# Patient Record
Sex: FEMALE | Race: WHITE | NOT HISPANIC OR LATINO | Employment: FULL TIME | URBAN - METROPOLITAN AREA
[De-identification: names, ages, dates, MRNs, and addresses within clinical notes are randomized per-mention and may not be internally consistent; named-entity substitution may affect disease eponyms.]

---

## 2017-04-17 ENCOUNTER — ALLSCRIPTS OFFICE VISIT (OUTPATIENT)
Dept: OTHER | Facility: OTHER | Age: 47
End: 2017-04-17

## 2017-04-17 ENCOUNTER — GENERIC CONVERSION - ENCOUNTER (OUTPATIENT)
Dept: OTHER | Facility: OTHER | Age: 47
End: 2017-04-17

## 2017-04-17 DIAGNOSIS — Z12.31 ENCOUNTER FOR SCREENING MAMMOGRAM FOR MALIGNANT NEOPLASM OF BREAST: ICD-10-CM

## 2017-04-17 DIAGNOSIS — R92.8 OTHER ABNORMAL AND INCONCLUSIVE FINDINGS ON DIAGNOSTIC IMAGING OF BREAST: ICD-10-CM

## 2017-04-20 ENCOUNTER — GENERIC CONVERSION - ENCOUNTER (OUTPATIENT)
Dept: OTHER | Facility: OTHER | Age: 47
End: 2017-04-20

## 2017-04-20 LAB
ADEQUACY: (HISTORICAL): NORMAL
CLINICIAN PROVIDIED ICD 9 OR 10 (HISTORICAL): NORMAL
COMMENT (HISTORICAL): NORMAL
DIAGNOSIS (HISTORICAL): NORMAL
NOTE: (HISTORICAL): NORMAL
PERFORMED BY (HISTORICAL): NORMAL
REFLEX (HISTORICAL): NORMAL

## 2017-05-04 ENCOUNTER — GENERIC CONVERSION - ENCOUNTER (OUTPATIENT)
Dept: OTHER | Facility: OTHER | Age: 47
End: 2017-05-04

## 2017-05-04 ENCOUNTER — APPOINTMENT (OUTPATIENT)
Dept: LAB | Facility: CLINIC | Age: 47
End: 2017-05-04
Payer: COMMERCIAL

## 2017-05-04 ENCOUNTER — TRANSCRIBE ORDERS (OUTPATIENT)
Dept: LAB | Facility: CLINIC | Age: 47
End: 2017-05-04

## 2017-05-04 DIAGNOSIS — Z13.21 SCREENING FOR MALNUTRITION: Primary | ICD-10-CM

## 2017-05-04 LAB — VENIPUNCTURE: NORMAL

## 2017-05-04 PROCEDURE — 36415 COLL VENOUS BLD VENIPUNCTURE: CPT | Performed by: NURSE PRACTITIONER

## 2017-05-05 ENCOUNTER — GENERIC CONVERSION - ENCOUNTER (OUTPATIENT)
Dept: OTHER | Facility: OTHER | Age: 47
End: 2017-05-05

## 2017-05-05 LAB
25(OH)D3 SERPL-MCNC: 11.4 NG/ML (ref 30–100)
A/G RATIO (HISTORICAL): 1.2 (ref 1.2–2.2)
ALBUMIN SERPL BCP-MCNC: 3.6 G/DL (ref 3.5–5.5)
ALP SERPL-CCNC: 58 IU/L (ref 39–117)
ALT SERPL W P-5'-P-CCNC: 12 IU/L (ref 0–32)
AST SERPL W P-5'-P-CCNC: 15 IU/L (ref 0–40)
BILIRUB SERPL-MCNC: 0.3 MG/DL (ref 0–1.2)
BUN SERPL-MCNC: 6 MG/DL (ref 6–24)
BUN/CREA RATIO (HISTORICAL): 8 (ref 9–23)
CALCIUM SERPL-MCNC: 9.1 MG/DL (ref 8.7–10.2)
CHLORIDE SERPL-SCNC: 102 MMOL/L (ref 96–106)
CHOLEST SERPL-MCNC: 197 MG/DL (ref 100–199)
CHOLEST/HDLC SERPL: 4 RATIO UNITS (ref 0–4.4)
CO2 SERPL-SCNC: 21 MMOL/L (ref 18–29)
CREAT SERPL-MCNC: 0.76 MG/DL (ref 0.57–1)
DEPRECATED RDW RBC AUTO: 14.3 % (ref 12.3–15.4)
EGFR AFRICAN AMERICAN (HISTORICAL): 107 ML/MIN/1.73
EGFR-AMERICAN CALC (HISTORICAL): 93 ML/MIN/1.73
GLUCOSE SERPL-MCNC: 92 MG/DL (ref 65–99)
HCT VFR BLD AUTO: 35.9 % (ref 34–46.6)
HDLC SERPL-MCNC: 49 MG/DL
HGB BLD-MCNC: 11.4 G/DL (ref 11.1–15.9)
INTERPRETATION (HISTORICAL): NORMAL
LDLC SERPL CALC-MCNC: 108 MG/DL (ref 0–99)
MCH RBC QN AUTO: 25.8 PG (ref 26.6–33)
MCHC RBC AUTO-ENTMCNC: 31.8 G/DL (ref 31.5–35.7)
MCV RBC AUTO: 81 FL (ref 79–97)
PLATELET # BLD AUTO: 317 X10E3/UL (ref 150–379)
POTASSIUM SERPL-SCNC: 4.3 MMOL/L (ref 3.5–5.2)
RBC (HISTORICAL): 4.42 X10E6/UL (ref 3.77–5.28)
SODIUM SERPL-SCNC: 140 MMOL/L (ref 134–144)
TOT. GLOBULIN, SERUM (HISTORICAL): 3 G/DL (ref 1.5–4.5)
TOTAL PROTEIN (HISTORICAL): 6.6 G/DL (ref 6–8.5)
TRIGL SERPL-MCNC: 199 MG/DL (ref 0–149)
TSH SERPL DL<=0.05 MIU/L-ACNC: 1.62 UIU/ML (ref 0.45–4.5)
VLDLC SERPL CALC-MCNC: 40 MG/DL (ref 5–40)
WBC # BLD AUTO: 6.7 X10E3/UL (ref 3.4–10.8)

## 2017-05-10 ENCOUNTER — HOSPITAL ENCOUNTER (OUTPATIENT)
Dept: RADIOLOGY | Facility: CLINIC | Age: 47
Discharge: HOME | End: 2017-05-10
Payer: COMMERCIAL

## 2017-05-10 DIAGNOSIS — Z12.31 ENCOUNTER FOR SCREENING MAMMOGRAM FOR MALIGNANT NEOPLASM OF BREAST: ICD-10-CM

## 2017-05-10 PROCEDURE — G0202 SCR MAMMO BI INCL CAD: HCPCS

## 2017-05-11 ENCOUNTER — GENERIC CONVERSION - ENCOUNTER (OUTPATIENT)
Dept: OTHER | Facility: OTHER | Age: 47
End: 2017-05-11

## 2017-05-22 ENCOUNTER — HOSPITAL ENCOUNTER (OUTPATIENT)
Dept: RADIOLOGY | Facility: HOSPITAL | Age: 47
Discharge: HOME | End: 2017-05-22
Payer: COMMERCIAL

## 2017-05-22 ENCOUNTER — GENERIC CONVERSION - ENCOUNTER (OUTPATIENT)
Dept: OTHER | Facility: OTHER | Age: 47
End: 2017-05-22

## 2017-05-22 DIAGNOSIS — R92.8 OTHER ABNORMAL AND INCONCLUSIVE FINDINGS ON DIAGNOSTIC IMAGING OF BREAST: ICD-10-CM

## 2017-05-22 PROCEDURE — G0206 DX MAMMO INCL CAD UNI: HCPCS

## 2017-05-22 PROCEDURE — 76642 ULTRASOUND BREAST LIMITED: CPT

## 2017-07-22 ENCOUNTER — HOSPITAL ENCOUNTER (EMERGENCY)
Facility: HOSPITAL | Age: 47
Discharge: HOME | End: 2017-07-22
Attending: EMERGENCY MEDICINE | Admitting: EMERGENCY MEDICINE
Payer: COMMERCIAL

## 2017-07-22 VITALS
HEIGHT: 65 IN | DIASTOLIC BLOOD PRESSURE: 104 MMHG | SYSTOLIC BLOOD PRESSURE: 214 MMHG | OXYGEN SATURATION: 98 % | RESPIRATION RATE: 20 BRPM | TEMPERATURE: 97.7 F | HEART RATE: 78 BPM | BODY MASS INDEX: 38.32 KG/M2 | WEIGHT: 230 LBS

## 2017-07-22 VITALS
HEART RATE: 86 BPM | RESPIRATION RATE: 20 BRPM | DIASTOLIC BLOOD PRESSURE: 105 MMHG | WEIGHT: 230 LBS | TEMPERATURE: 97 F | OXYGEN SATURATION: 98 % | SYSTOLIC BLOOD PRESSURE: 195 MMHG | BODY MASS INDEX: 38.32 KG/M2 | HEIGHT: 65 IN

## 2017-07-22 DIAGNOSIS — I10 HIGH BLOOD PRESSURE: Primary | ICD-10-CM

## 2017-07-22 DIAGNOSIS — K08.89 TOOTH ACHE: ICD-10-CM

## 2017-07-22 DIAGNOSIS — K08.89 PAIN, DENTAL: Primary | ICD-10-CM

## 2017-07-22 PROCEDURE — 99282 EMERGENCY DEPT VISIT SF MDM: CPT

## 2017-07-22 RX ORDER — IBUPROFEN 200 MG
600 TABLET ORAL ONCE
COMMUNITY
End: 2018-07-03

## 2017-07-22 RX ORDER — ACETAMINOPHEN 325 MG/1
650 TABLET ORAL EVERY 6 HOURS PRN
COMMUNITY
End: 2020-10-10

## 2017-07-22 RX ORDER — OXYCODONE HYDROCHLORIDE AND ACETAMINOPHEN 5; 325 MG/1; MG/1
1 TABLET ORAL ONCE
Status: COMPLETED | OUTPATIENT
Start: 2017-07-22 | End: 2017-07-22

## 2017-07-22 RX ORDER — BUPIVACAINE HYDROCHLORIDE 5 MG/ML
5 INJECTION, SOLUTION EPIDURAL; INTRACAUDAL ONCE
Status: DISCONTINUED | OUTPATIENT
Start: 2017-07-22 | End: 2017-07-22 | Stop reason: HOSPADM

## 2017-07-22 RX ORDER — ONDANSETRON 4 MG/1
4 TABLET, ORALLY DISINTEGRATING ORAL EVERY 8 HOURS PRN
Qty: 20 TABLET | Refills: 0 | Status: SHIPPED | OUTPATIENT
Start: 2017-07-22 | End: 2018-07-03

## 2017-07-22 RX ORDER — OXYCODONE HYDROCHLORIDE AND ACETAMINOPHEN 5; 325 MG/1; MG/1
1 TABLET ORAL EVERY 6 HOURS PRN
Qty: 20 TABLET | Refills: 0 | Status: SHIPPED | OUTPATIENT
Start: 2017-07-22 | End: 2017-08-01

## 2017-07-22 RX ORDER — ONDANSETRON 4 MG/1
4 TABLET, ORALLY DISINTEGRATING ORAL ONCE
Status: COMPLETED | OUTPATIENT
Start: 2017-07-22 | End: 2017-07-22

## 2017-07-22 RX ORDER — PENICILLIN V POTASSIUM 250 MG/1
250 TABLET ORAL 4 TIMES DAILY
Qty: 40 TABLET | Refills: 0 | Status: SHIPPED | OUTPATIENT
Start: 2017-07-22 | End: 2017-07-29

## 2017-07-22 RX ORDER — BUPIVACAINE HYDROCHLORIDE AND EPINEPHRINE 5; 5 MG/ML; UG/ML
1 INJECTION, SOLUTION EPIDURAL; INTRACAUDAL; PERINEURAL ONCE
Status: COMPLETED | OUTPATIENT
Start: 2017-07-22 | End: 2017-07-22

## 2017-07-22 RX ADMIN — OXYCODONE HYDROCHLORIDE AND ACETAMINOPHEN 1 TABLET: 5; 325 TABLET ORAL at 01:07

## 2017-07-22 RX ADMIN — ONDANSETRON 4 MG: 4 TABLET, ORALLY DISINTEGRATING ORAL at 21:34

## 2017-07-22 RX ADMIN — BUPIVACAINE HYDROCHLORIDE AND EPINEPHRINE BITARTRATE 1 ML: 5; .005 INJECTION, SOLUTION SUBCUTANEOUS at 21:36

## 2017-11-17 ENCOUNTER — TRANSCRIBE ORDERS (OUTPATIENT)
Dept: ADMINISTRATIVE | Facility: HOSPITAL | Age: 47
End: 2017-11-17

## 2017-11-17 DIAGNOSIS — R92.8 OTHER ABNORMAL AND INCONCLUSIVE FINDINGS ON DIAGNOSTIC IMAGING OF BREAST: ICD-10-CM

## 2017-11-17 DIAGNOSIS — Z09 FOLLOW-UP EXAM, 3-6 MONTHS SINCE PREVIOUS EXAM: Primary | ICD-10-CM

## 2017-11-27 ENCOUNTER — HOSPITAL ENCOUNTER (OUTPATIENT)
Dept: RADIOLOGY | Facility: HOSPITAL | Age: 47
Discharge: HOME | End: 2017-11-27
Payer: COMMERCIAL

## 2017-11-27 ENCOUNTER — GENERIC CONVERSION - ENCOUNTER (OUTPATIENT)
Dept: OTHER | Facility: OTHER | Age: 47
End: 2017-11-27

## 2017-11-27 DIAGNOSIS — Z09 FOLLOW-UP EXAM, 3-6 MONTHS SINCE PREVIOUS EXAM: ICD-10-CM

## 2017-11-27 PROCEDURE — G0206 DX MAMMO INCL CAD UNI: HCPCS

## 2018-01-10 NOTE — RESULT NOTES
Discussion/Summary   slight abnormality in outer left breast  diag mammo and u/s ordered  please schedule     Verified Results  * MAMMO SCREENING BILATERAL W CAD [id number] 08:00AM Alek Red Order Number: [id number]    - Patient Instructions: To schedule this appointment, please contact Central Scheduling at [phone]  Do not wear any perfume, powder, lotion or deodorant on breast or underarm area  Please bring your doctors order, referral (if needed) and insurance information with you on the day of the test  Failure to bring this information may result in this test being rescheduled  Arrive 15 minutes prior to your appointment time to register  On the day of your test, please bring any prior mammogram or breast studies with you that were not performed at a [facility]  Failure to bring prior exams may result in your test needing to be rescheduled  Test Name Result Flag Reference   MAMMO SCREENING BILATERAL W CAD (Report)     Patient History:   No known family history of cancer  No Hormone Replacement Therapy   Patient has never smoked  Patient's BMI is 38 3  Reason for exam: screening, asymptomatic  Screening     Mammo Screening Bilateral W CAD: May 10, 2017 - Check In #:    [de-identified]   Bilateral MLO, CC, and XCCL view(s) were taken  Technologist: HILARIO Bartlett   Prior study comparison: November 1, 2011, bilateral screening    mammogram, performed at [facility]  The breast tissue is heterogeneously dense, potentially limiting    the sensitivity of mammography  Patient risk, included in this    report, assists in determining the appropriate screening regimen    (such as 3-D mammography or the inclusion of automated breast    ultrasound or MRI)  3-D mammography may also remain indicated as    screening   There is a vague patch of asymmetric tissue with    questionable associated architectural distortion in the outer    left breast, identified on the craniocaudal and axillary rotated    craniocaudal views  This is located 10 cm deep to the nipple  This is most likely located in the upper half of the breast  The   patient should return for lateral and spot compression views,    possible rolled craniocaudal views, and possible ultrasound, to    ensure this is nothing more than summation artifact  The parenchymal pattern is otherwise stable  No dominant soft    tissue mass, architectural distortion or suspicious    calcifications are noted in either breast  The skin and nipple    contours are within normal limits  1  Vague asymmetry with questionable associated architectural    distortion outer left breast  Further evaluation recommended  2  Stable right mammogram      ACR BI-RADSï¾® Assessments: BiRad:0 - Incomplete: needs additional    imaging evaluation - Left     Recommendation:   Further imaging of the left breast    A breast health care nurse from our facility will be contacting    the patient regarding the need for additional imaging  Analyzed by CAD     8-10% of cancers will be missed on mammography  Management of a    palpable abnormality must be based on clinical grounds  Patients   will be notified of their results via letter from our facility  Accredited by [employer] of [employer] and FDA       Transcription Location: [facility] 98: [id number]     Risk Value(s):   Tyrer-Cuzick 10 Year: 2 900%, Tyrer-Cuzick Lifetime: 13 600%,    Myriad Table: 1 5%, JACINTO 5 Year: 0 9%, NCI Lifetime: 9 3%   Signed by:   Umer Amin MD   5/10/17       Signatures   Electronically signed by : BARBARA Pierson; May 11 2017  8:37AM EST                       (Author)

## 2018-01-11 NOTE — RESULT NOTES
Discussion/Summary   blood work is very good  vit D is very low  Vit D replacement called to pharmacy  Vit d 70463oftd once weekly for 12 weeks  Verified Results  (1) VITAMIN D 25-HYDROXY [id number] 12:00AM [facility]     Test Name Result Flag Reference   Vitamin D, 25-Hydroxy 11 4 ng/mL L 30 0-100 0   Vitamin D deficiency has been defined by the [address] practice guideline as a  level of serum 25-OH vitamin D less than 20 ng/mL (1,2)  The Endocrine Society went on to further define vitamin D  insufficiency as a level between 21 and 29 ng/mL (2)  1  IOM ([place] of [employer])  2010  Dietary reference     intakes for calcium and D  [address] [facility]: The     [employer]  2  [place] MF, Jim BUNDY, Melaine WEAVER, et al      Evaluation, treatment, and prevention of vitamin D     deficiency: an Endocrine Society clinical practice     guideline  JC  2011 Jul; 96(7):1911-30       (1) COMPREHENSIVE METABOLIC PANEL [id number] 18:80ZN IQMS     Test Name Result Flag Reference   Glucose, Serum 92 mg/dL  65-99   BUN 6 mg/dL  6-24   Creatinine, Serum 0 76 mg/dL  0 57-1 00   BUN/Creatinine Ratio 8 L 9-23   Sodium, Serum 140 mmol/L  134-144   Potassium, Serum 4 3 mmol/L  3 5-5 2   Chloride, Serum 102 mmol/L     Carbon Dioxide, Total 21 mmol/L  18-29   Calcium, Serum 9 1 mg/dL  8 7-10 2   Protein, Total, Serum 6 6 g/dL  6 0-8 5   Albumin, Serum 3 6 g/dL  3 5-5 5   Globulin, Total 3 0 g/dL  1 5-4 5   A/G Ratio 1 2  1 2-2 2   Bilirubin, Total 0 3 mg/dL  0 0-1 2   Alkaline Phosphatase, S 58 IU/L     AST (SGOT) 15 IU/L  0-40   ALT (SGPT) 12 IU/L  0-32   eGFR If NonAfricn Am 93 mL/min/1 73  >59   eGFR If Africn Am 107 mL/min/1 73  >59     (1) CBC/ PLT (NO DIFF) [id number] 12:00AM [facility]     Test Name Result Flag Reference   WBC 6 7 x10E3/uL  3 4-10 8   RBC 4 42 x10E6/uL  3 77-5 28   Hemoglobin 11 4 g/dL  11 1-15 9   Hematocrit 35 9 % 34 0-46  6   MCV 81 fL  79-97   MCH 25 8 pg L 26 6-33 0   MCHC 31 8 g/dL  31 5-35 7   RDW 14 3 %  12 3-15 4   Platelets 932 B56T0/HG  150-379     (1) TSH WITH FT4 REFLEX [id number] 12:00AM [facility]     Test Name Result Flag Reference   TSH 1 620 uIU/mL  0 450-4 500     (1) LIPID PANEL, FASTING [id number] 12:00AM [facility]     Test Name Result Flag Reference   Cholesterol, Total 197 mg/dL  100-199   Triglycerides 199 mg/dL H 0-149   HDL Cholesterol 49 mg/dL  >39   VLDL Cholesterol Julius 40 mg/dL  5-40   LDL Cholesterol Calc 108 mg/dL H 0-99   T  Chol/HDL Ratio 4 0 ratio units  0 0-4 4   T  Chol/HDL Ratio                                                             Men  Women                                               1/2 Avg  Risk  3 4    3 3                                                   Avg Risk  5 0    4 4                                                2X Avg  Risk  9 6    7 1                                                3X Avg  Risk 23 4   11 0     [facility]) Cardiovascular Risk Assessment [id number] 12:00AM [facility]     Test Name Result Flag Reference   Interpretation Note     -------------------------------  CARDIOVASCULAR REPORT:  -------------------------------  Current available clinical information suggests the  patient's risk is at least LOW  If the patient has two or  more major risk factors, the risk category is intermediate  If the patient has CHD or a CHD risk equivalent, the risk  category is high  If patient does not have CHD or a CHD risk  equivalent, consider use of the Pooled Cohort Equations to  estimate 10-year CVD risk, as individuals with greater than  7 5% risk may warrant more intensive therapy  The calculator  can be found at:  http://tools  cardiosource org/WIPIH-Pseb-Rnlbqdjia/  -  Insulin resistance, obesity, excessive alcohol use, smoking,  thyroid disease, nephrotic syndrome, liver disease, and  certain medications are all causes of secondary  dyslipidemia   Consider evaluation if clinically indicated  -  Fasting status is unknown; lipid assessment and treatment  suggestions assume patient was fasting  Therapeutic  lifestyle changes are always valuable to achieve optimal  blood lipid status (diet, exercise, weight management)  -------------------------------  LIPID MANAGEMENT  Select one patient risk category based upon medical history  and clinical judgment  Additional risk factors such as  personal or family history of premature CHD, smoking, and  hypertension modify a patient's goals of therapy  In CVD  prevention, the intensity of therapy should be adjusted to  the level of patient risk  MODERATE intensity statin therapy  generally results in an average LDL-C reduction of 30% to  less than 50% from the untreated baseline  Examples include  (daily doses): atorvastatin 10-20 mg, rosuvastatin 5-10 mg,  simvastatin 20-40 mg, pravastatin 40-80 mg, lovastatin 40  mg  HIGH intensity statin therapy generally results in an  average LDL-C reduction of 50% or more from the untreated  baseline  Examples include (daily doses): atorvastatin 40-80  mg and rosuvastatin 20 mg   -------------------------------  LOW RISK ASSESSMENT AND TREATMENT SUGGESTIONS  -------------------------------  LDL-C is acceptable, 108 mg/dL  Non-HDL Cholesterol is  acceptable, 148 mg/dL  -  Considerations for use of statin therapy include family  history of premature atherosclerotic disease, elevated  coronary artery calcium score, ankle-brachial index < 0 9,  elevated CRP, or elevated 10-year or lifetime CVD risk  Fasting status of blood is unknown; non-fasting may  contribute to hypertriglyceridemia  Triglycerides should  only be measured in a fasting state   -------------------------------  INTERMEDIATE RISK ASSESSMENT AND TREATMENT SUGGESTIONS  -------------------------------  LDL-C is acceptable, 108 mg/dL  Non-HDL Cholesterol is  acceptable, 148 mg/dL    -  Consider measurement of LDL particle number or Apo B to  adjudicate need for further LDL lowering therapy  Consider  beginning or increasing statin  Factors that may influence  statin use include family history of premature  atherosclerotic disease, elevated coronary artery calcium  score, ankle-brachial index < 0 9, elevated CRP, or elevated  10-year or lifetime CVD risk  If statin cannot be tolerated  or increased, alternatives include use of an intestinal  agent (ezetimibe or bile acid sequestrant), niacin, and/or  fish oil   -------------------------------  HIGH RISK ASSESSMENT AND TREATMENT SUGGESTIONS  -------------------------------  LDL-C is borderline high, 108 mg/dL  Non-HDL Cholesterol is  borderline high, 148 mg/dL  -  Begin statin  If statin already in use, consider increasing  dose to achieve at least a 50% LDL reduction from baseline  Moderate or high intensity statin is preferred  If statin  cannot be tolerated or increased, alternatives include use  of an intestinal agent (ezetimibe or bile acid sequestrant),  niacin, and/or fish oil   -------------------------------  DISCLAIMER  These assessments and treatment suggestions are provided as  a convenience in support of the physician-patient  relationship and are not intended to replace the physician's  clinical judgment  They are derived from the national  guidelines in addition to other evidence and expert opinion  The clinician should consider this information within the  context of clinical opinion and the individual patient  SEE GUIDANCE FOR CARDIOVASCULAR REPORT: National Heart,  Lung, and Blood Wheeler's Third Report of the NCEP Expert  Panel on Detection, Evaluation and Treatment of High Blood  Cholesterol in Adults (ATP III) (2002  NIH publication  );  Jung et al  Diabetes Care 2008; 31(4):811-82;  Alexa et al  Clin Chem 2009; 55(3):407-419; Brayden Moura et  al  2013 ACC/AHA guideline on the treatment of blood  cholesterol to reduce atherosclerotic cardiovascular risk in  adults: a report of the American College of  Cardiology/American Heart Association Task Force on Practice  Guidelines  Circulation 6521;863(NFVLH 2):S1? S45  PDF Image            Plan  Vitamin D deficiency    · Vitamin D (Ergocalciferol) 15353 UNIT Oral Capsule; TAKE 1 CAPSULE Weekly

## 2018-01-12 VITALS
DIASTOLIC BLOOD PRESSURE: 90 MMHG | BODY MASS INDEX: 38.93 KG/M2 | RESPIRATION RATE: 14 BRPM | HEIGHT: 64 IN | SYSTOLIC BLOOD PRESSURE: 130 MMHG | HEART RATE: 84 BPM | WEIGHT: 228 LBS | TEMPERATURE: 98.1 F

## 2018-01-12 NOTE — PROGRESS NOTES
Assessment    1  Never a smoker   2  Occasional alcohol use   3  Daily caffeinated coffee consumption   4  Family history of hypertension (V17 49) (Z82 49) : Father   5  Family history of diabetes mellitus (V18 0) (Z83 3) : Father   6  Family history of Healthy adult : Mother, Father   7  Encounter for preventive health examination (V70 0) (Z00 00)   8  Snoring (786 09) (R06 83)   9  Witnessed apneic spells (786 03) (R06 81)   10  Menstrual irregularity (626 4) (N92 6)   11  Screening for diabetes mellitus (V77 1) (Z13 1)   12  Encounter for vitamin deficiency screening (V77 99) (Z13 21)   13  Encounter for lipid screening for cardiovascular disease (V77 91,V81 2) (Z13 220,Z13 6)   14  Other fatigue (780 79) (R53 83)   15  Encounter for gynecological examination with Papanicolaou smear of cervix (V72 31)    (Z01 419)   16  History of  Section    Plan  Encounter for gynecological examination with Papanicolaou smear of cervix    · (1) THIN PREP PAP WITH IMAGING; Status:Active; Requested for:45Arm9280; Maturation index required? : No  HPV? : if ASCUS  Encounter for lipid screening for cardiovascular disease    · (1) LIPID PANEL, FASTING; Status:Active; Requested for:05Rfc9028; Health Maintenance    · Follow Up in 2 Years Evaluation and Treatment  Follow-up  Status: Hold For - Scheduling   Requested for: 87Jrj7810   · Begin a limited exercise program ; Status:Complete;   Done: 63RZM4687   · Eat a low fat and low cholesterol diet ; Status:Complete;   Done: 09GYF1963   · Some eating tips that can help you lose weight ; Status:Complete;   Done: 07SUG6792   · We recommend that you bring your body mass index down to 26 ; Status:Complete;    Done: 30ULH5210   · Call [phone] if: You find a new or different kind of lump in your breast ;  Status:Complete;   Done: 21MEI6306   · Call [phone] if:  You have any warning signs of skin cancer ; Status:Complete;    Done: 75QVG2367  Health Maintenance, Encounter for vitamin deficiency screening    · (1) VITAMIN D 25-HYDROXY; Status:Active; Requested for:30Vea1094; Health Maintenance, Screening for diabetes mellitus    · (1) CBC/ PLT (NO DIFF); Status:Active; Requested for:82Plm2688;    · (1) COMPREHENSIVE METABOLIC PANEL; Status:Active; Requested for:92Jhk2399;   Menstrual irregularity    · (1) TSH WITH FT4 REFLEX; Status:Active; Requested for:50Nvz5562; Other fatigue, Snoring, Witnessed apneic spells    · *Polysomnography, Sleep Study, Diagnostic; Status:Need Information - Financial  Authorization; Requested for:17Apr2017;   there any other medical conditions or medications that would explain these      symptoms? : No  is the sleep disturbance affecting the patient's ability to function? :    History/Symptoms: : snoring, daytime fatigue, witnessed apnea  a face-to-face visit, with sleep documentation, performed prior to the order for sleep      study? : Yes  Study Only or Consult : Sleep Study Only Follow up with Referring Physician    Discussion/Summary  health maintenance visit Currently, she eats an adequate diet and has an inadequate exercise regimen  the risks and benefits of cervical cancer screening were discussed Pap test with reflex HPV testing was done today cervical cancer screening is needed every three years Breast cancer screening: the risks and benefits of breast cancer screening were discussed, monthly self breast exam was advised and mammogram has been ordered  Colorectal cancer screening: colorectal cancer screening is not indicated  Osteoporosis screening: bone mineral density testing is not indicated  Screening lab work includes hemoglobin, glucose, lipid profile, thyroid function testing and 25-hydroxyvitamin D  Advice and education were given regarding nutrition, aerobic exercise, weight loss and cardiovascular risk reduction  Patient discussion: discussed with the patient     The patient was counseled regarding instructions for management, patient and family education, impressions, risks and benefits of treatment options, importance of compliance with treatment  The treatment plan was reviewed with the patient/guardian  The patient/guardian understands and agrees with the treatment plan      Chief Complaint  PE with pap LMP 3 weeks ago  ck/lpn      History of Present Illness  HM, Adult Female: The patient is being seen for a health maintenance and gynecology evaluation  The last health maintenance visit was 4 year(s) ago  General Health: The patient's health since the last visit is described as good  She has regular dental visits  She denies vision problems  She denies hearing loss  Immunizations status: up to date  Lifestyle:  She consumes a diverse and healthy diet  She does not have any weight concerns  She exercises regularly  She does not use tobacco  She denies alcohol use  She denies drug use  Reproductive health: the patient is perimenopausal   she reports abnormal menses  Menstrual history: The cycles are irregular  she uses contraception  For contraception, she has a partner with a vasectomy  she is sexually active  Screening: cancer screening reviewed and current  Cervical cancer screening includes a pap smear performed 4 years ago  Breast cancer screening includes a mammogram performed 4 years ago  She hasn't been previously screened for colorectal cancer  metabolic screening reviewed and current  Metabolic screening includes uncertain timing of her last lipid profile, uncertain timing of her last glucose screening, uncertain timing of her last thyroid function test and no previous DEXA  risk screening reviewed and current  Cardiovascular risk factors: obesity, sedentary lifestyle and family history of cardiovascular disease, but no hypertension  General health risks: abnormal cervical cytology  Safety elements used: seat belt and safe driving habits     Risk assessments performed include depression symptoms  Risk findings: no depression symptoms  HPI: wants to be checked for sleep apnea   reports snoring, witnessed apnea      Review of Systems    Constitutional: feeling tired, but no fever  Eyes: No complaints of eye pain, no red eyes, no eyesight problems, no discharge, no dry eyes, no itching of eyes  ENT: no complaints of earache, no loss of hearing, no nose bleeds, no nasal discharge, no sore throat, no hoarseness  Cardiovascular: No complaints of slow heart rate, no fast heart rate, no chest pain, no palpitations, no leg claudication, no lower extremity edema  Respiratory: No complaints of shortness of breath, no wheezing, no cough, no SOB on exertion, no orthopnea, no PND  Gastrointestinal: No complaints of abdominal pain, no constipation, no nausea or vomiting, no diarrhea, no bloody stools  Genitourinary: No complaints of dysuria, no incontinence, no pelvic pain, no dysmenorrhea, no vaginal discharge or bleeding  Musculoskeletal: No complaints of arthralgias, no myalgias, no joint swelling or stiffness, no limb pain or swelling  Integumentary: No complaints of skin rash or lesions, no itching, no skin wounds, no breast pain or lump  Neurological: No complaints of headache, no confusion, no convulsions, no numbness, no dizziness or fainting, no tingling, no limb weakness, no difficulty walking  Psychiatric: Not suicidal, no sleep disturbance, no anxiety or depression, no change in personality, no emotional problems  Endocrine: No complaints of proptosis, no hot flashes, no muscle weakness, no deepening of the voice, no feelings of weakness  Hematologic/Lymphatic: No complaints of swollen glands, no swollen glands in the neck, does not bleed easily, does not bruise easily  Active Problems    1  _ (V72 31)   2  Acute bronchitis (466 0) (J20 9)   3  Acute upper respiratory infection (465 9) (J06 9)   4   Encounter for screening mammogram for malignant neoplasm of breast (V76 12)   (Z12 31)   5  Lumbago (724 2) (M54 5)   6  Post-artificial Menopause State (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 4)   7  Screening for malignant neoplasm of cervix (V76 2) (Z12 4)   8  Streptococcal sore throat (034 0) (J02 0)   9  Well adult on routine health check (V70 0) (Z00 00)    Surgical History    · History of  Section    Allergies    1  No Known Drug Allergies    Vitals   Recorded: 35Uwe2992 01:05PM   Temperature 98 1 F, Tympanic   Heart Rate 84, R Radial   Pulse Quality Normal, R Radial   Respiration Quality Normal   Respiration 14   Systolic 860, RUE, Sitting   Diastolic 90, RUE, Sitting   Height 5 ft 4 in   Weight 228 lb    BMI Calculated 39 14   BSA Calculated 2 07     Physical Exam    Constitutional   General appearance: No acute distress, well appearing and well nourished  Head and Face   Head and face: Normal     Eyes   Conjunctiva and lids: No swelling, erythema or discharge  Pupils and irises: Equal, round, reactive to light  Ears, Nose, Mouth, and Throat   External inspection of ears and nose: Normal     Otoscopic examination: Tympanic membranes translucent with normal light reflex  Canals patent without erythema  Hearing: Normal     Nasal mucosa, septum, and turbinates: Normal without edema or erythema  Lips, teeth, and gums: Normal, good dentition  Oropharynx: Normal with no erythema, edema, exudate or lesions  Neck   Neck: Supple, symmetric, trachea midline, no masses  Thyroid: Normal, no thyromegaly  Pulmonary   Respiratory effort: No increased work of breathing or signs of respiratory distress  Auscultation of lungs: Clear to auscultation  Cardiovascular   Palpation of heart: Normal PMI, no thrills  Auscultation of heart: Normal rate and rhythm, normal S1 and S2, no murmurs  Carotid pulses: 2+ bilaterally  Abdominal aorta: Normal     Pedal pulses: 2+ bilaterally      Examination of extremities for edema and/or varicosities: Normal     Chest   Breasts: Normal, no dimpling or skin changes appreciated  Palpation of breasts and axillae: Normal, no masses palpated  Abdomen   Abdomen: Non-tender, no masses  Liver and spleen: No hepatomegaly or splenomegaly  Examination for hernias: No hernia appreciated  Genitourinary   External genitalia and vagina: Normal, no lesions appreciated  Urethra: Normal, no discharge  Bladder: Not distended, no tenderness  Cervix: Normal, no lesions, Pap obtained  Uterus: Normal size, no tenderness, no masses  Adnexa/Parametria: Normal, no masses or tenderness  Lymphatic   Palpation of lymph nodes in neck: No lymphadenopathy  Palpation of lymph nodes in axillae: No lymphadenopathy  Musculoskeletal   Gait and station: Normal     Digits and nails: Normal without clubbing or cyanosis  Joints, bones, and muscles: Normal     Range of motion: Normal     Stability: Normal     Muscle strength/tone: Normal     Skin   Skin and subcutaneous tissue: Normal without rashes or lesions  Neurologic   Cranial nerves: Cranial nerves II-XII intact  Reflexes: 2+ and symmetric  Sensation: No sensory loss  Psychiatric   Judgment and insight: Normal     Orientation to person, place, and time: Normal     Recent and remote memory: Intact  Mood and affect: Normal        Results/Data  PHQ-2 Adult Depression Screening 69Mll5254 01:10PM User, [facility]     Test Name Result Flag Reference   PHQ-2 Adult Depression Score 0     Over the last two weeks, how often have you been bothered by any of the following problems? Little interest or pleasure in doing things: Not at all - 0  Feeling down, depressed, or hopeless: Not at all - 0   PHQ-2 Adult Depression Screening Negative         Signatures   Electronically signed by : BARBARA Childress;  Apr 17 2017  1:53PM EST                       (Author)    Electronically signed by : SB Raman ; Apr 17 2017  2:33PM EST                       (Author)

## 2018-01-12 NOTE — RESULT NOTES
Discussion/Summary   no mass is seen  previous area of assymetry is less prominent  f/u mammo in 6 months     Verified Results  MAMMO DIAGNOSTIC LEFT W CAD [id number] 09:40AM Debora Osullivan     Test Name Result Flag Reference   MAMMO DIAGNOSTIC LEFT W CAD (Report)     Patient History:   No known family history of cancer  No Hormone Replacement Therapy   Patient has never smoked  Patient's BMI is 38 3  Reason for exam: addl evaluation requested from abnormal    screening  Previously seen area of asymmetry     Mammo Diagnostic Left W CAD: November 27, 2017 - Check In #:    [de-identified]   CC, ML, spot compression CC, and MLO view(s) were taken of the    left breast      Technologist: HILARIO Emery   Prior study comparison: May 22, 2017, mammo diagnostic left W CAD   performed at [place] [address]  May 22, 2017,   US breast left limited performed at [place] [address]  May 10, 2017, mammo screening bilateral W CAD,    performed at [address]  [facility]  November 1, 2011, bilateral screening mammogram performed at [place] [address]  The breast tissue is heterogeneously dense, potentially limiting    the sensitivity of mammography  Patient risk, included in this    report, assists in determining the appropriate screening regimen    (such as 3-D mammography or the inclusion of automated breast    ultrasound or MRI)  3-D mammography may also remain indicated as    screening  Previously described area of asymmetry in the    upper-outer breast is much less conspicuous  No dominant mass is   seen  There is no architectural distortion  Previously described area of asymmetry is much less    conspicuous  ACR BI-RADSï¾® Assessments: BiRad:3 - Probably Benign     Recommendation:   Follow-up diagnostic mammogram of the left breast in 6 months  Routine screening mammogram of the right breast in 6 months     Analyzed by CAD The patient is scheduled in a reminder system for screening    mammography  8-10% of cancers will be missed on mammography  Management of a    palpable abnormality must be based on clinical grounds  Patients   will be notified of their results via letter from our facility  Accredited by [employer] of [employer] and FDA       Transcription Location: [facility] 143: [id number]     Risk Value(s):   Tyrer-Cuzick 10 Year: 2 900%, Tyrer-Cuzick Lifetime: 13 600%,    Myriad Table: 1 5%, JACINTO 5 Year: 0 9%, NCI Lifetime: 9 3%   Signed by:   Delaney Pham MD   11/27/17       Signatures   Electronically signed by : BARBARA Abdul; Nov 27 2017 10:53AM EST                       (Author)

## 2018-01-14 NOTE — RESULT NOTES
Discussion/Summary   pap normal     Verified Results  (LC) Pap IG, rfx HPV ASCU [id number] 12:00AM Aldean Deal     Test Name Result Flag Reference   DIAGNOSIS: Comment     NEGATIVE FOR INTRAEPITHELIAL LESION AND MALIGNANCY  THE CYTOLOGY PROCESSING WAS PERFORMED AT THE [facility] LOCATED AT  [facility], [address] [id number]  Specimen adequacy: Comment     Satisfactory for evaluation  Endocervical and/or squamous metaplastic  cells (endocervical component) are present  Clinician provided ICD10: Comment     Z01 419   Performed by: Alisha Reed, Cytotechnologist (ASCP)     William Rios Note: Comment     The Pap smear is a screening test designed to aid in the detection of  premalignant and malignant conditions of the uterine cervix  It is not a  diagnostic procedure and should not be used as the sole means of detecting  cervical cancer  Both false-positive and false-negative reports do occur    Comment     The HPV DNA reflex criteria were not met with this specimen result  therefore, no HPV testing was performed  Signatures   Electronically signed by : BARBARA Munguia;  Apr 20 2017  8:09PM EST                       (Author)

## 2018-01-17 NOTE — RESULT NOTES
Discussion/Summary   imaging stable  Repeat mammo and u/s in 6 months  Monitor breasts for any changes  Call with concerns     Verified Results  *US BREAST LEFT LIMITED (DIAGNOSTIC) [id number] 08:03AM Valene Babinski Order Number: [id number]    - Patient Instructions: To schedule this appointment, please contact Central Scheduling at [phone]  Test Name Result Flag Reference   US BREAST LEFT LIMITED (Report)     Patient History:   No known family history of cancer  No Hormone Replacement Therapy   Patient has never smoked  Patient's BMI is 38 3  Reason for exam: addl evaluation requested from abnormal    screening  Callback from screening mammogram      Mammo Diagnostic Left W CAD: May 22, 2017 - Check In #: [de-identified]   CC, XCCL, and ML view(s) were taken of the left breast      Technologist: HILARIO William   Prior study comparison: May 10, 2017, mammo screening bilateral W   CAD, performed at [address] [place] [address]  November 1, 2011, bilateral screening mammogram performed at [address] [place] [address]  The breast tissue is heterogeneously dense, potentially limiting    the sensitivity of mammography  Patient risk, included in this    report, assists in determining the appropriate screening regimen    (such as 3-D mammography or the inclusion of automated breast    ultrasound or MRI)  3-D mammography may also remain indicated as    screening  The screening mammogram described a tissue asymmetry    in the outer left breast with questionable associated    architectural distortion  There was no correlate on the MLO    projection  This area is much less prominent on compression    views  A definite correlate again was not visualized on the    straight lateral view although the majority of the breast tissue    is in the upper breast  Targeted ultrasound of the upper outer    left breast is recommended       Targeted sonographic evaluation of the upper outer quadrant of    the left breast reveals an 8 x 4 x 8 mm complicated cyst at the    12 o'clock position 5 cm from the nipple  There is a 9 x 4 x 8    mm complicated cyst at the 3 o'clock position 7 cm from the    nipple  There is no solid mass or abnormal shadowing  US Breast Left Limited: May 22, 2017 - Check In #: [de-identified]   Standard views  Technologist: Marie Harrison RDMS   Heterogeneity can be either focal or diffuse  The breast    echotexture is characterized by multiple small areas of increased   and decreased echogenicity  Shadowing may occur at the    interfaces of the fat lobules and parenchyma  This pattern occurs   in younger breasts and those with heterogeneously dense    parenchyma depicted mammographically  Probable    complicated cysts left breast  The previously described    asymmetry with questionable architectural distortion in the outer   left breast is much less prominent on compression views  Recommend additional 6 month follow-up diagnostic mammogram to    confirm stability of these findings  ACR BI-RADSï¾® Assessments: BiRad:3 - Probably Benign (Overall)   Left breast Left Diag Mammo: BiRad:3 - probably benign finding in   the left breast    Left breast Left Brst US: BiRad:3 - probably benign finding in    the left breast      Recommendation:   Follow-up diagnostic mammogram of the left breast in 6 months  Analyzed by CAD     Transcription Location: [facility] 98: [id number]     Risk Value(s):   Tyrer-Cuzick 10 Year: 2 900%, Tyrer-Cuzick Lifetime: 13 600%,    Myriad Table: 1 5%, JACINTO 5 Year: 0 9%, NCI Lifetime: 9 3%   Signed by:   Quincy Fernando MD   5/22/17     MAMMO DIAGNOSTIC LEFT W CAD [id number] 08:02AM [employer] Order Number: [id number]    - Patient Instructions: To schedule this appointment, please contact Central Scheduling at [phone]  Do not wear any perfume, powder, lotion or deodorant on breast or underarm area     Please bring your doctors order, referral (if needed) and insurance information with you on the day of the test  Failure to bring this information may result in this test being rescheduled  Arrive 15 minutes prior to your appointment time to register  On the day of your test, please bring any prior mammogram or breast studies with you that were not performed at a [facility]  Failure to bring prior exams may result in your test needing to be rescheduled  Test Name Result Flag Reference   MAMMO DIAGNOSTIC LEFT W CAD (Report)     Patient History:   No known family history of cancer  No Hormone Replacement Therapy   Patient has never smoked  Patient's BMI is 38 3  Reason for exam: addl evaluation requested from abnormal    screening  Callback from screening mammogram      Mammo Diagnostic Left W CAD: May 22, 2017 - Check In #: [de-identified]   CC, XCCL, and ML view(s) were taken of the left breast      Technologist: HILARIO Mac   Prior study comparison: May 10, 2017, mammo screening bilateral W   CAD, performed at [address]  [facility]  November 1, 2011, bilateral screening mammogram performed at [place] [address]  The breast tissue is heterogeneously dense, potentially limiting    the sensitivity of mammography  Patient risk, included in this    report, assists in determining the appropriate screening regimen    (such as 3-D mammography or the inclusion of automated breast    ultrasound or MRI)  3-D mammography may also remain indicated as    screening  The screening mammogram described a tissue asymmetry    in the outer left breast with questionable associated    architectural distortion  There was no correlate on the MLO    projection  This area is much less prominent on compression    views   A definite correlate again was not visualized on the    straight lateral view although the majority of the breast tissue    is in the upper breast  Targeted ultrasound of the upper outer    left breast is recommended  Targeted sonographic evaluation of the upper outer quadrant of    the left breast reveals an 8 x 4 x 8 mm complicated cyst at the    12 o'clock position 5 cm from the nipple  There is a 9 x 4 x 8    mm complicated cyst at the 3 o'clock position 7 cm from the    nipple  There is no solid mass or abnormal shadowing  US Breast Left Limited: May 22, 2017 - Check In #: [de-identified]   Standard views  Technologist: Fausto Elizabeth RDMS   Heterogeneity can be either focal or diffuse  The breast    echotexture is characterized by multiple small areas of increased   and decreased echogenicity  Shadowing may occur at the    interfaces of the fat lobules and parenchyma  This pattern occurs   in younger breasts and those with heterogeneously dense    parenchyma depicted mammographically  Probable    complicated cysts left breast  The previously described    asymmetry with questionable architectural distortion in the outer   left breast is much less prominent on compression views  Recommend additional 6 month follow-up diagnostic mammogram to    confirm stability of these findings  ACR BI-RADSï¾® Assessments: BiRad:3 - Probably Benign (Overall)   Left breast Left Diag Mammo: BiRad:3 - probably benign finding in   the left breast    Left breast Left Brst US: BiRad:3 - probably benign finding in    the left breast      Recommendation:   Follow-up diagnostic mammogram of the left breast in 6 months     Analyzed by CAD     Transcription Location: [facility] 98: [id number]     Risk Value(s):   Tyrer-Cuzick 10 Year: 2 900%, Tyrer-Cuzick Lifetime: 13 600%,    Myriad Table: 1 5%, JACINTO 5 Year: 0 9%, NCI Lifetime: 9 3%   Signed by:   Jacqueline Farah MD   5/22/17       Signatures   Electronically signed by : BARBARA Mackey; May 22 2017  1:13PM EST                       (Author)

## 2018-05-04 ENCOUNTER — OFFICE VISIT (OUTPATIENT)
Dept: FAMILY MEDICINE CLINIC | Facility: CLINIC | Age: 48
End: 2018-05-04
Payer: COMMERCIAL

## 2018-05-04 VITALS
BODY MASS INDEX: 38.99 KG/M2 | TEMPERATURE: 98.2 F | RESPIRATION RATE: 16 BRPM | WEIGHT: 234 LBS | DIASTOLIC BLOOD PRESSURE: 80 MMHG | SYSTOLIC BLOOD PRESSURE: 130 MMHG | HEIGHT: 65 IN | HEART RATE: 72 BPM

## 2018-05-04 DIAGNOSIS — F41.9 ANXIETY: ICD-10-CM

## 2018-05-04 DIAGNOSIS — Z00.00 HEALTH CARE MAINTENANCE: Primary | ICD-10-CM

## 2018-05-04 DIAGNOSIS — E55.9 VITAMIN D DEFICIENCY: ICD-10-CM

## 2018-05-04 DIAGNOSIS — R92.2 DENSE BREASTS: ICD-10-CM

## 2018-05-04 DIAGNOSIS — Z13.0 SCREENING FOR DEFICIENCY ANEMIA: ICD-10-CM

## 2018-05-04 DIAGNOSIS — R23.2 HOT FLASHES: ICD-10-CM

## 2018-05-04 DIAGNOSIS — Z71.3 ENCOUNTER FOR WEIGHT LOSS COUNSELING: ICD-10-CM

## 2018-05-04 DIAGNOSIS — R06.81 WITNESSED EPISODE OF APNEA: ICD-10-CM

## 2018-05-04 DIAGNOSIS — R06.83 SNORING: ICD-10-CM

## 2018-05-04 DIAGNOSIS — Z13.1 DIABETES MELLITUS SCREENING: ICD-10-CM

## 2018-05-04 DIAGNOSIS — R92.8 ABNORMAL MAMMOGRAM OF LEFT BREAST: ICD-10-CM

## 2018-05-04 DIAGNOSIS — Z13.220 LIPID SCREENING: ICD-10-CM

## 2018-05-04 PROBLEM — N92.6 MENSTRUAL IRREGULARITY: Status: ACTIVE | Noted: 2017-04-17

## 2018-05-04 PROBLEM — R92.30 DENSE BREASTS: Status: ACTIVE | Noted: 2018-05-04

## 2018-05-04 PROCEDURE — 99396 PREV VISIT EST AGE 40-64: CPT | Performed by: NURSE PRACTITIONER

## 2018-05-04 RX ORDER — ERGOCALCIFEROL 1.25 MG/1
1 CAPSULE ORAL WEEKLY
COMMUNITY
Start: 2017-05-05 | End: 2018-07-03

## 2018-05-04 RX ORDER — BUPROPION HYDROCHLORIDE 150 MG/1
150 TABLET ORAL DAILY
Qty: 30 TABLET | Refills: 4 | Status: SHIPPED | OUTPATIENT
Start: 2018-05-04 | End: 2020-10-10

## 2018-05-04 NOTE — PROGRESS NOTES
FAMILY PRACTICE HEALTH MAINTENANCE OFFICE VISIT  [facility] - [facility]    NAME: Estefania Borrero  AGE: 51 y o  SEX: female  : 1970     DATE: 2018    Assessment and Plan     Problem List Items Addressed This Visit     Abnormal mammogram of left breast    Relevant Orders    Mammo diagnostic bilateral w 3d & cad    US breast left limited (diagnostic)    Vitamin D deficiency    Relevant Orders    Vitamin D 25 hydroxy    Anxiety    Relevant Medications    buPROPion (WELLBUTRIN XL) 150 mg 24 hr tablet    Other Relevant Orders    TSH, 3rd generation    Witnessed episode of apnea    Relevant Orders    Home Study    Snoring    Relevant Orders    Home Study    Dense breasts    Relevant Orders    Mammo diagnostic bilateral w 3d & cad    US breast left limited (diagnostic)      Other Visit Diagnoses     Health care maintenance    -  Primary    Relevant Orders    Basic metabolic panel    Encounter for weight loss counseling        Relevant Medications    buPROPion (WELLBUTRIN XL) 150 mg 24 hr tablet    Hot flashes        Relevant Medications    buPROPion (WELLBUTRIN XL) 150 mg 24 hr tablet    Other Relevant Orders    TSH, 3rd generation    CBC and Platelet    Lipid screening        Relevant Orders    Lipid panel    Diabetes mellitus screening        Relevant Orders    Basic metabolic panel    Screening for deficiency anemia        Relevant Orders    CBC and Platelet    BMI 11 4-59 1,KYRCT                · Patient Counseling:   · Nutrition: Stressed importance of a well balanced diet, moderation of sodium/saturated fat, caloric balance and sufficient intake of fiber  · Exercise: Stressed the importance of regular exercise with a goal of 150 minutes per week  · Dental Health: Discussed daily flossing and brushing and regular dental visits   · Injury Prevention: Discussed Safety Belts, Safety Helmets, and Smoke Detectors    · Immunizations reviewed   utd  · Discussed benefits of screening mammo  Pap and colonoscopy due next year  · Discussed the patient's BMI with her  The BMI is above average; BMI management plan is completed     Return in about 6 weeks (around 6/15/2018) for Recheck  Chief Complaint     Chief Complaint   Patient presents with    Annual Exam     pap last year negative    regular eye exams at [address] [place] [address] in [facility] Menopause       History of Present Illness     Here for annual PE    In usual state of health    Started exercise program in Jan 2018  Unable to lose weight  No significant diet changes    Still snoring,  reports witnessed apnea  Has not had sleep study yet    STOP BANG score 5    Showing signs of perimenopause=more menstrual irregularity, moodiness, anxiety, hot flashes        Well Adult Physical   Patient here for a comprehensive physical exam       Diet and Physical Activity  Diet: low fat diet  Weight concerns: Patient has class 1 obesity (BMI 30-34  9)  Exercise: several times per week      Depression Screen  PHQ-9 Depression Screening    PHQ-9:    Frequency of the following problems over the past two weeks:       Little interest or pleasure in doing things:  0 - not at all  Feeling down, depressed, or hopeless:  0 - not at all  PHQ-2 Score:  0          General Health  Hearing: Normal:  bilateral  Vision: no vision problems  Dental: regular dental visits    Reproductive Health  Perimenopause  Menses becoming more irregular  Starting to have hot flashes      The following portions of the patient's history were reviewed and updated as appropriate: allergies, current medications, past family history, past medical history, past social history, past surgical history and problem list     Review of Systems     Review of Systems   Constitutional: Positive for fatigue  Psychiatric/Behavioral: Positive for sleep disturbance  Negative for dysphoric mood and suicidal ideas  The patient is nervous/anxious      All other systems reviewed and are negative  Past Medical History     History reviewed  No pertinent past medical history  Past Surgical History     Past Surgical History:   Procedure Laterality Date     SECTION         Social History     Social History     Social History    Marital status: /Civil Union     Spouse name: N/A    Number of children: N/A    Years of education: N/A     Social History Main Topics    Smoking status: Never Smoker    Smokeless tobacco: Never Used    Alcohol use Yes      Comment: socially    Drug use: No    Sexual activity: Not Asked     Other Topics Concern    None     Social History Narrative    Daily caffeinated coffee consumption           Family History     Family History   Problem Relation Age of Onset    Diabetes Father     Hypertension Father        Current Medications       Current Outpatient Prescriptions:     acetaminophen (TYLENOL) 325 mg tablet, Take 650 mg by mouth every 6 (six) hours as needed for mild pain, Disp: , Rfl:     buPROPion (WELLBUTRIN XL) 150 mg 24 hr tablet, Take 1 tablet (150 mg total) by mouth daily, Disp: 30 tablet, Rfl: 4    ergocalciferol (VITAMIN D2) 50,000 units, Take 1 capsule by mouth once a week, Disp: , Rfl:     ibuprofen (MOTRIN) 200 mg tablet, Take 600 mg by mouth once, Disp: , Rfl:     ondansetron (ZOFRAN-ODT) 4 mg disintegrating tablet, Take 1 tablet by mouth every 8 (eight) hours as needed for nausea or vomiting, Disp: 20 tablet, Rfl: 0     Allergies     No Known Allergies    Objective     /80 (BP Location: Left arm, Patient Position: Sitting, Cuff Size: Adult)   Pulse 72   Temp 98 2 °F (36 8 °C) (Temporal)   Resp 16   Ht 5' 5" (1 651 m)   Wt 106 kg (234 lb)   BMI 38 94 kg/m²      Physical Exam   Constitutional: She is oriented to person, place, and time  Vital signs are normal  She appears well-developed and well-nourished  No distress  HENT:   Head: Normocephalic and atraumatic     Mouth/Throat: Oropharynx is clear and moist  Eyes: Conjunctivae, EOM and lids are normal  Pupils are equal, round, and reactive to light  Lids are everted and swept, no foreign bodies found  No scleral icterus  Neck: Normal range of motion  Neck supple  No JVD present  Carotid bruit is not present  No thyroid mass and no thyromegaly present  Cardiovascular: Normal rate, regular rhythm, normal heart sounds and normal pulses  No murmur heard  Pulmonary/Chest: Effort normal and breath sounds normal  No respiratory distress  Abdominal: Soft  Normal appearance, normal aorta and bowel sounds are normal  There is no splenomegaly or hepatomegaly  There is no tenderness  No hernia  Musculoskeletal: Normal range of motion  She exhibits no edema  Lymphadenopathy:     She has no cervical adenopathy  She has no axillary adenopathy  Neurological: She is alert and oriented to person, place, and time  She has normal strength  No cranial nerve deficit or sensory deficit  Skin: Skin is warm, dry and intact  No pallor  Psychiatric: She has a normal mood and affect  Her behavior is normal    Nursing note and vitals reviewed          No exam data present    Health Maintenance     Health Maintenance   Topic Date Due    HIV SCREENING  1970    Depression Screening PHQ-9  04/15/1981    PAP SMEAR  04/17/2018    INFLUENZA VACCINE  09/01/2018    DTaP,Tdap,and Td Vaccines (2 - Td) 08/11/2021     Immunization History   Administered Date(s) Administered    Tdap 08/11/2011       Constance Morton, [address]

## 2018-05-10 ENCOUNTER — TELEPHONE (OUTPATIENT)
Dept: FAMILY MEDICINE CLINIC | Facility: CLINIC | Age: 48
End: 2018-05-10

## 2018-05-10 DIAGNOSIS — R06.83 SNORING: Primary | ICD-10-CM

## 2018-05-10 DIAGNOSIS — R06.81 WITNESSED EPISODE OF APNEA: ICD-10-CM

## 2018-05-10 DIAGNOSIS — R29.818 SUSPECTED SLEEP APNEA: ICD-10-CM

## 2018-05-10 NOTE — TELEPHONE ENCOUNTER
Moriah Junior called from [facility] [phone] home sleep test not approved states pt  Needs to have done at sleep center we can call above number and speak with clinician

## 2018-05-22 ENCOUNTER — TRANSCRIBE ORDERS (OUTPATIENT)
Dept: SLEEP CENTER | Facility: CLINIC | Age: 48
End: 2018-05-22

## 2018-06-08 ENCOUNTER — HOSPITAL ENCOUNTER (OUTPATIENT)
Dept: RADIOLOGY | Facility: HOSPITAL | Age: 48
Discharge: HOME | End: 2018-06-08
Payer: COMMERCIAL

## 2018-06-08 ENCOUNTER — TELEPHONE (OUTPATIENT)
Dept: FAMILY MEDICINE CLINIC | Facility: CLINIC | Age: 48
End: 2018-06-08

## 2018-06-08 DIAGNOSIS — R92.2 DENSE BREASTS: ICD-10-CM

## 2018-06-08 DIAGNOSIS — R92.8 ABNORMAL MAMMOGRAM OF LEFT BREAST: ICD-10-CM

## 2018-06-08 PROCEDURE — 77066 DX MAMMO INCL CAD BI: CPT

## 2018-06-08 PROCEDURE — G0279 TOMOSYNTHESIS, MAMMO: HCPCS

## 2018-06-08 NOTE — TELEPHONE ENCOUNTER
----- [facility] sent at 6/8/2018 10:32 AM EDT -----  mammo is unchanged  No suspicious findings   Repeat in 1 year

## 2018-06-08 NOTE — TELEPHONE ENCOUNTER
----- [facility] sent at 6/8/2018 10:32 AM EDT -----  mammo is unchanged  No suspicious findings   Repeat in 1 year

## 2018-06-21 ENCOUNTER — HOSPITAL ENCOUNTER (OUTPATIENT)
Dept: SLEEP CENTER | Facility: CLINIC | Age: 48
Discharge: HOME | End: 2018-06-21
Payer: COMMERCIAL

## 2018-06-21 DIAGNOSIS — R06.81 WITNESSED EPISODE OF APNEA: ICD-10-CM

## 2018-06-21 DIAGNOSIS — R06.83 SNORING: ICD-10-CM

## 2018-06-21 DIAGNOSIS — R29.818 SUSPECTED SLEEP APNEA: ICD-10-CM

## 2018-06-21 PROCEDURE — 95810 POLYSOM 6/> YRS 4/> PARAM: CPT | Performed by: INTERNAL MEDICINE

## 2018-06-21 PROCEDURE — 95810 POLYSOM 6/> YRS 4/> PARAM: CPT

## 2018-06-22 ENCOUNTER — TRANSCRIBE ORDERS (OUTPATIENT)
Dept: SLEEP CENTER | Facility: CLINIC | Age: 48
End: 2018-06-22

## 2018-06-22 NOTE — PROGRESS NOTES
Sleep Study Documentation    Pre-Sleep Study       Sleep testing procedure explained to patient:YES    Patient napped prior to study:NO    Caffeine:Dayshift worker after 12PM   Caffeine use:NO    Alcohol:Dayshift workers after 5PM: Alcohol use:NO    Typical day for patient:YES       Study Documentation  Diagnostic   Snore: Moderate  Supplemental O2: no    O2 flow rate (L/min) range   O2 flow rate (L/min) final   Minimum SaO2 96 7  Baseline SaO2 76 0        Mode of Therapy:    EKG abnormalities: no     EEG abnormalities: no    Study Terminated:no    Patient classification: employed       Post-Sleep Study    Medication used at bedtime or during sleep study:NO    Patient reports time it took to fall asleep:20 to 30 minutes    Patient reports waking up during study:3 or more times  Patient reports returning to sleep in 10 to 30 minutes  Patient reports sleeping 2 to 4 hours with dreaming      Patient reports sleep during study:worse than usual    Patient rated sleepiness: Somewhat sleepy or tired    PAP treatment:no

## 2018-06-26 ENCOUNTER — TELEPHONE (OUTPATIENT)
Dept: FAMILY MEDICINE CLINIC | Facility: CLINIC | Age: 48
End: 2018-06-26

## 2018-06-26 NOTE — TELEPHONE ENCOUNTER
----- Message from Barak Boss, [address] sent at 6/26/2018  2:06 PM EDT -----  Please advise Royce Cervantes that sleep study was positive for sleep apnea  Please give her Dr Eliza Guaman (pulm/sleep medicine) number to schedule consult   TY

## 2018-06-27 ENCOUNTER — TELEPHONE (OUTPATIENT)
Dept: FAMILY MEDICINE CLINIC | Facility: CLINIC | Age: 48
End: 2018-06-27

## 2018-06-27 NOTE — TELEPHONE ENCOUNTER
Pt called concerned because she received a bill for $521 for her recent yearly mammo  She called her insurance company and they stated it was coded as diagnostic instead of routine  Please review chart to see if the coding can be changed to routine

## 2018-06-27 NOTE — TELEPHONE ENCOUNTER
She past mammos were abnormal  I was told by radiology in past that it should be a diagnostic mammo which is why order  Can you talk to radiology or billing on how we would change it to screening   Ty

## 2018-07-03 ENCOUNTER — OFFICE VISIT (OUTPATIENT)
Dept: PULMONOLOGY | Facility: MEDICAL CENTER | Age: 48
End: 2018-07-03
Payer: COMMERCIAL

## 2018-07-03 VITALS
TEMPERATURE: 97.8 F | SYSTOLIC BLOOD PRESSURE: 142 MMHG | OXYGEN SATURATION: 97 % | WEIGHT: 230 LBS | HEART RATE: 70 BPM | RESPIRATION RATE: 16 BRPM | HEIGHT: 65 IN | BODY MASS INDEX: 38.32 KG/M2 | DIASTOLIC BLOOD PRESSURE: 94 MMHG

## 2018-07-03 DIAGNOSIS — G47.33 OSA (OBSTRUCTIVE SLEEP APNEA): Primary | ICD-10-CM

## 2018-07-03 PROCEDURE — 99203 OFFICE O/P NEW LOW 30 MIN: CPT | Performed by: INTERNAL MEDICINE

## 2018-07-03 RX ORDER — CALCIUM CARBONATE/VITAMIN D3 600 MG-10
TABLET ORAL
COMMUNITY

## 2018-07-03 RX ORDER — DIPHENOXYLATE HYDROCHLORIDE AND ATROPINE SULFATE 2.5; .025 MG/1; MG/1
1 TABLET ORAL DAILY
COMMUNITY

## 2018-07-03 NOTE — PROGRESS NOTES
Assessment/Plan:       Problem List Items Addressed This Visit        Respiratory    LIBORIO (obstructive sleep apnea) - Primary     Patient's underlying risk factors are reviewed  Underlying pathophysiology and risks of untreated sleep apnea were discussed in detail  Treatment options including CPAP, surgical options, dental device or discussed in detail  Patient is agreeable to undergoing CPAP therapy  Auto CPAP is ordered for her today  Periodic mask, tubing, filter replacement every 1-3 months is advised and periodic  approximately every year is advised  Uses CPAP during all sleep periods is discussed  Avoidance of sedatives, supine sleep, alcohol, narcotics in the setting of untreated sleep apnea is discussed  Absolute avoidance of driving while drowsy is also discussed  Avoid weight gain/encourage weight loss  Relevant Orders    CPAP Auto New DME             Follow-up in 6-8 weeks   All questions are answered to the patient's satisfaction and understanding  She verbalizes understanding  She is encouraged to call with any further questions or concerns  Portions of the record may have been created with voice recognition software  Occasional wrong word or "sound a like" substitutions may have occurred due to the inherent limitations of voice recognition software  Read the chart carefully and recognize, using context, where substitutions have occurred  Electronically Signed by Electa Seip, MD    ______________________________________________________________________    Chief Complaint:   Chief Complaint   Patient presents with    Results     Sleep study        Patient ID: Nolvia Goldberg is a 51 y o  y o  female has no past medical history on file  7/3/2018  Patient presents for initial evaluation for sleep apnea  She did undergo diagnostic sleep testing  Snoring for awhile  More recently found to be gasping for air  Not getting quite as much sleep as she used to get  Time to bed is around 10-11 pm  It takes her about 10 min to fall asleep  Once asleep she wakes up about 1-4 times per night  She wakes up choking and gasping  It takes her about 5 minutes to fall back asleep  Time out of bed is around 6:30- 7 am  On weekends until 8 am    Occasionally feels refreshed  No daytime sleepiness  No daytime fatigue  She does drink caffeine about 1-2 cups of coffee and possibly one cup in the afternoon  She has gained about 10 pounds in the last year  She does exercise about 3 times a week  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   Travel history: no recent travel  Review of Systems   Constitutional: Negative for activity change, appetite change, chills, fever and unexpected weight change  HENT: Negative for congestion, dental problem, postnasal drip, sinus pain, sinus pressure, sore throat and voice change  Eyes: Negative for visual disturbance  Cardiovascular: Negative for chest pain, palpitations and leg swelling  Gastrointestinal: Negative for abdominal pain, diarrhea, nausea and vomiting  Genitourinary: Negative for difficulty urinating  Musculoskeletal: Negative for arthralgias  Skin: Negative for rash and wound  Allergic/Immunologic: Negative for environmental allergies and food allergies  Neurological: Negative for dizziness, light-headedness and headaches  Hematological: Negative for adenopathy  Psychiatric/Behavioral: Negative for agitation, behavioral problems and confusion  Social history: She reports that she has never smoked  She has never used smokeless tobacco  She reports that she drinks alcohol  She reports that she does not use drugs      Past surgical history:   Past Surgical History:   Procedure Laterality Date     SECTION       Family history:   Family History   Problem Relation Age of Onset    Diabetes Father     Hypertension Father        Immunization History   Administered Date(s) Administered    Tdap 08/11/2011     Current Outpatient Prescriptions   Medication Sig Dispense Refill    acetaminophen (TYLENOL) 325 mg tablet Take 650 mg by mouth every 6 (six) hours as needed for mild pain      buPROPion (WELLBUTRIN XL) 150 mg 24 hr tablet Take 1 tablet (150 mg total) by mouth daily 30 tablet 4    multivitamin (THERAGRAN) TABS Take 1 tablet by mouth daily      Omega 3 1200 MG CAPS Take by mouth       No current facility-administered medications for this visit  Allergies: Patient has no known allergies  Objective:  Vitals:    07/03/18 0912   BP: 142/94   BP Location: Left arm   Patient Position: Sitting   Cuff Size: Standard   Pulse: 70   Resp: 16   Temp: 97 8 °F (36 6 °C)   TempSrc: Tympanic   SpO2: 97%   Weight: 104 kg (230 lb)   Height: 5' 5" (1 651 m)   Oxygen Therapy  SpO2: 97 %    Wt Readings from Last 3 Encounters:   07/03/18 104 kg (230 lb)   05/04/18 106 kg (234 lb)   07/22/17 104 kg (230 lb)     Body mass index is 38 27 kg/m²  Physical Exam   Constitutional: She is oriented to person, place, and time  She appears well-developed and well-nourished  No distress  HENT:   Head: Normocephalic and atraumatic  Mouth/Throat: Oropharynx is clear and moist  No oropharyngeal exudate  Mallampati-4   Eyes: EOM are normal  Pupils are equal, round, and reactive to light  Neck: Normal range of motion  Neck supple  No tracheal deviation present  No thyromegaly present  Neck circumference 16   Cardiovascular: Normal rate and regular rhythm  No murmur heard  Pulmonary/Chest: Effort normal and breath sounds normal  No respiratory distress  She has no wheezes  She has no rales  She exhibits no tenderness  Abdominal: Soft  Bowel sounds are normal  She exhibits no distension  There is no tenderness  Musculoskeletal: Normal range of motion  She exhibits no edema  Lymphadenopathy:     She has no cervical adenopathy     Neurological: She is alert and oriented to person, place, and time  No cranial nerve deficit  Skin: Skin is warm and dry  She is not diaphoretic  Psychiatric: She has a normal mood and affect  Her behavior is normal    Vitals reviewed  Lab Review:   No visits with results within 2 Month(s) from this visit  Latest known visit with results is:   Generic Conversion - Encounter on 05/04/2017   Component Date Value    INTERPRETATION 05/04/2017 Note        Diagnostics:  I have personally reviewed pertinent reports

## 2018-07-03 NOTE — LETTER
July 5, 2018     Fausto Young, [address] [facility]    Patient: Ronald Carter   YOB: 1970   Date of Visit: 7/3/2018       Dear Dr Renata Lim: Thank you for referring Ronald Carter to me for evaluation  Below are my notes for this consultation  If you have questions, please do not hesitate to call me  I look forward to following your patient along with you  Sincerely,        Naun Richardson MD        CC: No Recipients  Naun Richardson MD  7/5/2018 10:33 AM  Sign at close encounter  Assessment/Plan:       Problem List Items Addressed This Visit        Respiratory    LIBORIO (obstructive sleep apnea) - Primary     Patient's underlying risk factors are reviewed  Underlying pathophysiology and risks of untreated sleep apnea were discussed in detail  Treatment options including CPAP, surgical options, dental device or discussed in detail  Patient is agreeable to undergoing CPAP therapy  Auto CPAP is ordered for her today  Periodic mask, tubing, filter replacement every 1-3 months is advised and periodic  approximately every year is advised  Uses CPAP during all sleep periods is discussed  Avoidance of sedatives, supine sleep, alcohol, narcotics in the setting of untreated sleep apnea is discussed  Absolute avoidance of driving while drowsy is also discussed  Avoid weight gain/encourage weight loss  Relevant Orders    CPAP Auto New DME             Follow-up in 6-8 weeks   All questions are answered to the patient's satisfaction and understanding  She verbalizes understanding  She is encouraged to call with any further questions or concerns  Portions of the record may have been created with voice recognition software  Occasional wrong word or "sound a like" substitutions may have occurred due to the inherent limitations of voice recognition software    Read the chart carefully and recognize, using context, where substitutions have occurred  Electronically Signed by Canelo Richey MD    ______________________________________________________________________    Chief Complaint:   Chief Complaint   Patient presents with    Results     Sleep study        Patient ID: Zackery Angeles is a 51 y o  y o  female has no past medical history on file  7/3/2018  Patient presents for initial evaluation for sleep apnea  She did undergo diagnostic sleep testing  Snoring for awhile  More recently found to be gasping for air  Not getting quite as much sleep as she used to get  Time to bed is around 10-11 pm  It takes her about 10 min to fall asleep  Once asleep she wakes up about 1-4 times per night  She wakes up choking and gasping  It takes her about 5 minutes to fall back asleep  Time out of bed is around 6:30- 7 am  On weekends until 8 am    Occasionally feels refreshed  No daytime sleepiness  No daytime fatigue  She does drink caffeine about 1-2 cups of coffee and possibly one cup in the afternoon  She has gained about 10 pounds in the last year  She does exercise about 3 times a week  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   Travel history: no recent travel  Review of Systems   Constitutional: Negative for activity change, appetite change, chills, fever and unexpected weight change  HENT: Negative for congestion, dental problem, postnasal drip, sinus pain, sinus pressure, sore throat and voice change  Eyes: Negative for visual disturbance  Cardiovascular: Negative for chest pain, palpitations and leg swelling  Gastrointestinal: Negative for abdominal pain, diarrhea, nausea and vomiting  Genitourinary: Negative for difficulty urinating  Musculoskeletal: Negative for arthralgias  Skin: Negative for rash and wound  Allergic/Immunologic: Negative for environmental allergies and food allergies  Neurological: Negative for dizziness, light-headedness and headaches     Hematological: Negative for adenopathy  Psychiatric/Behavioral: Negative for agitation, behavioral problems and confusion  Social history: She reports that she has never smoked  She has never used smokeless tobacco  She reports that she drinks alcohol  She reports that she does not use drugs  Past surgical history:   Past Surgical History:   Procedure Laterality Date     SECTION       Family history:   Family History   Problem Relation Age of Onset    Diabetes Father     Hypertension Father        Immunization History   Administered Date(s) Administered    Tdap 2011     Current Outpatient Prescriptions   Medication Sig Dispense Refill    acetaminophen (TYLENOL) 325 mg tablet Take 650 mg by mouth every 6 (six) hours as needed for mild pain      buPROPion (WELLBUTRIN XL) 150 mg 24 hr tablet Take 1 tablet (150 mg total) by mouth daily 30 tablet 4    multivitamin (THERAGRAN) TABS Take 1 tablet by mouth daily      Omega 3 1200 MG CAPS Take by mouth       No current facility-administered medications for this visit  Allergies: Patient has no known allergies  Objective:  Vitals:    18 0912   BP: 142/94   BP Location: Left arm   Patient Position: Sitting   Cuff Size: Standard   Pulse: 70   Resp: 16   Temp: 97 8 °F (36 6 °C)   TempSrc: Tympanic   SpO2: 97%   Weight: 104 kg (230 lb)   Height: 5' 5" (1 651 m)   Oxygen Therapy  SpO2: 97 %    Wt Readings from Last 3 Encounters:   18 104 kg (230 lb)   18 106 kg (234 lb)   17 104 kg (230 lb)     Body mass index is 38 27 kg/m²  Physical Exam   Constitutional: She is oriented to person, place, and time  She appears well-developed and well-nourished  No distress  HENT:   Head: Normocephalic and atraumatic  Mouth/Throat: Oropharynx is clear and moist  No oropharyngeal exudate  Mallampati-4   Eyes: EOM are normal  Pupils are equal, round, and reactive to light  Neck: Normal range of motion  Neck supple  No tracheal deviation present  No thyromegaly present  Neck circumference 16   Cardiovascular: Normal rate and regular rhythm  No murmur heard  Pulmonary/Chest: Effort normal and breath sounds normal  No respiratory distress  She has no wheezes  She has no rales  She exhibits no tenderness  Abdominal: Soft  Bowel sounds are normal  She exhibits no distension  There is no tenderness  Musculoskeletal: Normal range of motion  She exhibits no edema  Lymphadenopathy:     She has no cervical adenopathy  Neurological: She is alert and oriented to person, place, and time  No cranial nerve deficit  Skin: Skin is warm and dry  She is not diaphoretic  Psychiatric: She has a normal mood and affect  Her behavior is normal    Vitals reviewed  Lab Review:   No visits with results within 2 Month(s) from this visit  Latest known visit with results is:   Generic Conversion - Encounter on 05/04/2017   Component Date Value    INTERPRETATION 05/04/2017 Note        Diagnostics:  I have personally reviewed pertinent reports

## 2018-07-05 NOTE — ASSESSMENT & PLAN NOTE
Patient's underlying risk factors are reviewed  Underlying pathophysiology and risks of untreated sleep apnea were discussed in detail  Treatment options including CPAP, surgical options, dental device or discussed in detail  Patient is agreeable to undergoing CPAP therapy  Auto CPAP is ordered for her today  Periodic mask, tubing, filter replacement every 1-3 months is advised and periodic  approximately every year is advised  Uses CPAP during all sleep periods is discussed  Avoidance of sedatives, supine sleep, alcohol, narcotics in the setting of untreated sleep apnea is discussed  Absolute avoidance of driving while drowsy is also discussed  Avoid weight gain/encourage weight loss

## 2018-07-11 ENCOUNTER — TELEPHONE (OUTPATIENT)
Dept: FAMILY MEDICINE CLINIC | Facility: CLINIC | Age: 48
End: 2018-07-11

## 2018-07-11 NOTE — TELEPHONE ENCOUNTER
Natalie Mckeon called states that this patients sleep study was performed in June  Patient has a low AHI so she requires a second dx for this process to proceed  The patient reports day time sleepiness and if you could addend the note from May to state the patient experiences this?     Phone  [phone]     Fax  [phone]

## 2018-07-21 ENCOUNTER — HOSPITAL ENCOUNTER (EMERGENCY)
Facility: HOSPITAL | Age: 48
Discharge: HOME | End: 2018-07-21
Attending: EMERGENCY MEDICINE
Payer: COMMERCIAL

## 2018-07-21 VITALS
HEART RATE: 74 BPM | OXYGEN SATURATION: 98 % | SYSTOLIC BLOOD PRESSURE: 156 MMHG | WEIGHT: 230 LBS | TEMPERATURE: 97.2 F | BODY MASS INDEX: 38.27 KG/M2 | RESPIRATION RATE: 18 BRPM | DIASTOLIC BLOOD PRESSURE: 91 MMHG

## 2018-07-21 DIAGNOSIS — H18.821 CORNEAL ABRASION DUE TO CONTACT LENS, RIGHT: Primary | ICD-10-CM

## 2018-07-21 PROCEDURE — 99283 EMERGENCY DEPT VISIT LOW MDM: CPT

## 2018-07-21 RX ORDER — PROPARACAINE HYDROCHLORIDE 5 MG/ML
2 SOLUTION/ DROPS OPHTHALMIC ONCE
Status: COMPLETED | OUTPATIENT
Start: 2018-07-21 | End: 2018-07-21

## 2018-07-21 RX ORDER — LEVOFLOXACIN 5 MG/ML
SOLUTION/ DROPS TOPICAL
Qty: 5 ML | Refills: 0 | Status: SHIPPED | OUTPATIENT
Start: 2018-07-21 | End: 2020-10-10

## 2018-07-21 RX ADMIN — PROPARACAINE HYDROCHLORIDE 2 DROP: 5 SOLUTION/ DROPS OPHTHALMIC at 11:20

## 2018-07-21 RX ADMIN — FLUORESCEIN SODIUM 1 STRIP: 0.6 STRIP OPHTHALMIC at 11:20

## 2018-07-21 NOTE — DISCHARGE INSTRUCTIONS
Corneal Abrasion   WHAT YOU NEED TO KNOW:   A corneal abrasion is a scratch on the cornea of your eye  The cornea is the clear layer that covers the front of your eye  A small scratch may heal in 1 to 2 days  Deeper or larger scratches may take longer to heal         DISCHARGE INSTRUCTIONS:   Contact your healthcare provider if:   · Your eye pain or vision gets worse  · You have yellow or green drainage from your eye  · You have questions or concerns about your condition or care  Medicines:   · Medicines  may be given in the form of eyedrops or ointment to help prevent an eye infection  You may also be given eye drops to decrease pain  Ask how to take this medicine safely  · Take your medicine as directed  Contact your healthcare provider if you think your medicine is not helping or if you have side effects  Tell him or her if you are allergic to any medicine  Keep a list of the medicines, vitamins, and herbs you take  Include the amounts, and when and why you take them  Bring the list or the pill bottles to follow-up visits  Carry your medicine list with you in case of an emergency  Follow up with your healthcare provider as directed:  Write down your questions so you remember to ask them during your visits  Self-care:   · Do not touch or rub your eye  · Ask your healthcare provider when you can start your normal activities  · Ask your healthcare provider when you can wear your contact lenses  · Wear sunglasses in bright light until your eyes feel better  Help prevent corneal abrasions:   · Remove your contact lenses if your eyes feel dry or irritated  · Wash your hands if you need to touch your eyes or your face  · Trim your child's fingernails so he cannot scratch his eye  · Wear protective eyewear when you work with chemicals, wood, dust, or metal      · Wear protective eyewear when you play sports  · Do not wear your contacts for longer than you should  · Do not wear colored lenses or lenses with shapes on them  These lenses may cause eye damage and vision loss  · Do not wear glitter makeup  Glitter can easily get into your eyes and under contact lenses  · Do not sleep with your contacts in your eyes  © 2017 [employer] Information is for End User's use only and may not be sold, redistributed or otherwise used for commercial purposes  All illustrations and images included in CareNotes® are the copyrighted property of [employer]  or Kenneth King  The above information is an  only  It is not intended as medical advice for individual conditions or treatments  Talk to your doctor, nurse or pharmacist before following any medical regimen to see if it is safe and effective for you  Care for Your Contact Lenses   AMBULATORY CARE:   Contact lenses  help correct vision problems  You need to know how to insert and remove contact lenses correctly  You also need to know how to clean and store them, and how often to change them  Proper cleaning and changing of your contacts can help prevent eye damage or infection  Seek care immediately if:   · You have a sudden change in your vision or complete loss of vision  · You have a foreign body in your eye  · Your contact lens gets stuck in your eye  · Your eye is bleeding or looks different than usual      · You have severe eye pain  Contact your ophthalmologist if:   · Your eyelid is swollen  · Your eyes are sensitive to light  · You get hit in the eye with an object  · You have changes in your vision  · Your eyes are red and draining yellow or white fluid  · You feel like there is something in your eye  · You have questions or concerns about your condition or care  Wash your hands before you touch your contact lenses:  Use soap and water  Do this before you insert, remove, or clean your lenses  This will prevent eye infections  Clean your contact lenses as directed:  Use a multipurpose contact lens solution as directed  Rub and rinse your lenses before you insert them or store them  Do not use water or eyedrops to clean your lenses  Keep your contact lens solution clean:  Do not let the tip of the bottle touch any surface or your hands  Do not put solution into a travel-sized container  Keep the cap on the bottle when you are not using it  Store your contact lenses correctly:  Store your lenses in new solution each time  Do not reuse contact lens solution  Do not store your contact lenses in water  Clean the case every day with multipurpose contact lens solution  Keep the case open and let it air dry when you are not using it  Replace the case every 3 months  Replace your contact lenses as directed:  Ask your healthcare provider how long you should wear a pair of contact lenses  Change your contact lenses as directed to prevent infection or injury to your eye  Throw away contact lenses that have a hole or tear in them  Remove your contact lenses before you swim: This will prevent damage to your contact lenses  It will also help prevent an infection  Remove your contact lens if you get chemicals in your eye: Throw away your contact lens and rinse your eye with water  Stand under a shower or faucet or pour water into your eye from a clean cup  Do this for 15 minutes  Ask your healthcare provider if it is safe to wear your contact lenses during sleep:  Extended-wear contact lenses can be worn overnight  Other types should be removed for sleep to prevent an eye injury  Do not smoke:  Nicotine and other chemicals in cigarettes and cigars can cause lung damage  Smoke can damage your contact lenses and cause eye problems  Ask your healthcare provider for information if you currently smoke and need help to quit  E-cigarettes or smokeless tobacco still contain nicotine   Talk to your healthcare provider before you use these products  What you need to know about contact lenses and makeup:   · Put in contact lenses before you put on makeup  · Do not wear lash-extending mascara  This type of mascara has fibers that can get on your contact and irritate your eye  · Do not wear waterproof mascara  This may stain your contact lenses  · Replace your mascara every 3 months to prevent infection  · Do not use fake eyelashes or eyelash cement  These products can damage your contacts lenses  · Remove your contact lenses before you remove makeup  Clean and store them as directed  · Do not apply eyeliner to the moist part of your eyelid  · Do not use hand cream or lotion before you touch your contact lenses  · Use hairspray before you put in your contact lenses  Follow up with your ophthalmologist as directed:  Get an eye exam once a year  Your healthcare provider can check for changes in your vision or eye problems  He can also make sure your contact lenses fit your eyes correctly  © 2017 [employer]  Information is for End User's use only and may not be sold, redistributed or otherwise used for commercial purposes  All illustrations and images included in CareNotes® are the copyrighted property of [employer]  or Kenneth King  The above information is an  only  It is not intended as medical advice for individual conditions or treatments  Talk to your doctor, nurse or pharmacist before following any medical regimen to see if it is safe and effective for you

## 2018-07-21 NOTE — ED PROVIDER NOTES
History  Chief Complaint   Patient presents with    Eye Pain     pt c/o r rye pain started Wed night,wears contact lenses normally not on today  pt took ibuprofen 0700 and now pain is 1/10  20/25 r eye 20/20 l eye     This is a 51 y o  old female who presents to the ED for evaluation of eye pain  Patient states over last 2 days she has had pain in her right eye  She initially had a foreign body sensation however she took out her contacts and then it slowly got better  The next day she had her contacts back in the pain got worse  Her eye was red and tearing  She had no blurry vision  No fever or chills  This never happened before  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 Taking? Omega 3 1200 MG CAPS   Yes No   Sig: Take by mouth   acetaminophen (TYLENOL) 325 mg tablet   Yes No   Sig: Take 650 mg by mouth every 6 (six) hours as needed for mild pain   buPROPion (WELLBUTRIN XL) 150 mg 24 hr tablet   No No   Sig: Take 1 tablet (150 mg total) by mouth daily   multivitamin (THERAGRAN) TABS   Yes No   Sig: Take 1 tablet by mouth daily      Facility-Administered Medications: None     History reviewed  No pertinent past medical history  Past Surgical History:   Procedure Laterality Date     SECTION       Family History   Problem Relation Age of Onset    Diabetes Father     Hypertension Father      I have reviewed and agree with the history as documented      Social History   Substance Use Topics    Smoking status: Never Smoker    Smokeless tobacco: Never Used   Alex Jaeger Alcohol use Yes      Comment: socially      Review of Systems   Constitutional: Negative for chills, fatigue, fever and unexpected weight change  HENT: Negative for congestion, rhinorrhea and sore throat  Eyes: Positive for pain and redness  Negative for visual disturbance  Respiratory: Negative for cough and shortness of breath  Cardiovascular: Negative for chest pain and leg swelling  Gastrointestinal: Negative for abdominal pain, constipation, diarrhea, nausea and vomiting  Endocrine: Negative for cold intolerance and heat intolerance  Genitourinary: Negative for dysuria, frequency and urgency  Musculoskeletal: Negative for back pain  Skin: Negative for rash  Neurological: Negative for dizziness, syncope and numbness  All other systems reviewed and are negative  Physical Exam  Physical Exam   Constitutional: She is oriented to person, place, and time  She appears well-developed and well-nourished  No distress  HENT:   Head: Normocephalic and atraumatic  Eyes: EOM and lids are normal  Pupils are equal, round, and reactive to light  Lids are everted and swept, no foreign bodies found  Right eye exhibits no chemosis  Left eye exhibits no chemosis  Right conjunctiva is injected  Right conjunctiva has no hemorrhage  Left conjunctiva is not injected  Left conjunctiva has no hemorrhage  Right eye exhibits no nystagmus  Left eye exhibits no nystagmus  Slit lamp exam:       The right eye shows no corneal abrasion, no corneal flare, no corneal ulcer, no hyphema and no hypopyon  The left eye shows no corneal abrasion, no corneal flare, no corneal ulcer, no hyphema and no hypopyon  Neck: Normal range of motion  Pulmonary/Chest: Effort normal  No stridor  No respiratory distress  Musculoskeletal: Normal range of motion  Neurological: She is alert and oriented to person, place, and time  Moving all extremities equally   Skin: Skin is warm and dry  No rash noted   She is not diaphoretic  Psychiatric: She has a normal mood and affect  Nursing note and vitals reviewed  Vital Signs  ED Triage Vitals [07/21/18 1041]   Temperature Pulse Respirations Blood Pressure SpO2   (!) 97 2 °F (36 2 °C) 74 18 156/91 98 %      Temp Source Heart Rate Source Patient Position - Orthostatic VS BP Location FiO2 (%)   Tympanic -- Lying Left arm --      Pain Score       --         Visual Acuity      Most Recent Value   Visual acuity R eye is  20/25   Visual acuity Left eye is  20/20   Wearing corrective eyewear/lenses? Yes        ED Medications  Medications   fluorescein sodium sterile ophthalmic strip 1 strip (1 strip Right Eye Given 7/21/18 1120)   proparacaine (ALCAINE) 0 5 % ophthalmic solution 2 drop (2 drops Both Eyes Given 7/21/18 1120)     Diagnostic Studies  Results Reviewed     None        No orders to display     Procedures  Procedures    Phone Contacts  ED Phone Contact    ED Course     A/P: This is a 51 y o  female who presents to the ED for evaluation of eye pain  Concern for abrasion  Even though not seen on exam, will treat clinically  No contacts x1w  Opthalmology follow up  I personally discussed return precautions with this patient and family  I provided the patient with written discharge instructions and particularly highlighted specific areas of interest to this patient, including but not limited to: medications for symptom managment, follow up recommendations, and return precautions  Patient and family are in agreement with this plan as outlined above      MDM  CritCare Time    Disposition  Final diagnoses:   Corneal abrasion due to contact lens, right     Time reflects when diagnosis was documented in both MDM as applicable and the Disposition within this note     Time User Action Codes Description Comment    7/21/2018 11:39 AM Kalpesh Alfredo Add [[id number] [id number]] Corneal abrasion due to contact lens, right       ED Disposition     ED Disposition Condition Comment    Discharge Irvin Zamorano discharge to home/self care  Condition at discharge: Stable        Follow-up Information     Follow up With Specialties Details Why Contact Info    Your opthalmologist  Schedule an appointment as soon as possible for a visit in 1 week For reevaluation           Patient's Medications   Discharge Prescriptions    LEVOFLOXACIN (QUIXIN) 0 5 % OPHTHALMIC SOLUTION    Two drops in the right eye every 2 hours while awake for 2 days then 2 drops every 4-6 hours for 5 days  Start Date: 7/21/2018 End Date: --       Order Dose: --       Quantity: 5 mL    Refills: 0     No discharge procedures on file      ED Provider  Electronically Signed by           Radha Ritchie MD  07/21/18 2313

## 2018-07-23 ENCOUNTER — VBI (OUTPATIENT)
Dept: ADMINISTRATIVE | Facility: OTHER | Age: 48
End: 2018-07-23

## 2018-07-23 NOTE — TELEPHONE ENCOUNTER
No outreach from [facility] [place] - patient is to follow up with her opthalmologist in 1 week

## 2018-07-30 ENCOUNTER — TELEPHONE (OUTPATIENT)
Dept: FAMILY MEDICINE CLINIC | Facility: CLINIC | Age: 48
End: 2018-07-30

## 2018-07-30 NOTE — TELEPHONE ENCOUNTER
Babita Manson called back again  Patients AHI is less than15  Requires an additional dx to be added to the note written prior to sleep study (if AHI <15- patient needs to have dx of HTN, impaired cognitive, excessive daytime sleepiness, etc) Patient saw pumlonlogy after the test was performed so Babita Coburn states that pulmonolgy will not be able to assist since the dx has to be present and documented prior to the completion of the sleep study  The note from patients last OV from here would have to state excessive daytime sleepiness in order to have the test be approved-  Otherwise patient will have to re complete the evaluation by PCP, specialty, have another sleep study done, etc  Will send this to martin upon her return from [facility]- both patient and Elvie Rosenberg are aware that the provider is on vacation

## 2018-09-28 ENCOUNTER — OFFICE VISIT (OUTPATIENT)
Dept: PULMONOLOGY | Facility: MEDICAL CENTER | Age: 48
End: 2018-09-28
Payer: COMMERCIAL

## 2018-09-28 VITALS
HEIGHT: 65 IN | TEMPERATURE: 98.6 F | BODY MASS INDEX: 40.65 KG/M2 | HEART RATE: 67 BPM | RESPIRATION RATE: 12 BRPM | SYSTOLIC BLOOD PRESSURE: 110 MMHG | OXYGEN SATURATION: 98 % | WEIGHT: 244 LBS | DIASTOLIC BLOOD PRESSURE: 76 MMHG

## 2018-09-28 DIAGNOSIS — G47.33 OSA (OBSTRUCTIVE SLEEP APNEA): Primary | ICD-10-CM

## 2018-09-28 PROBLEM — R06.81 WITNESSED EPISODE OF APNEA: Status: RESOLVED | Noted: 2018-05-04 | Resolved: 2018-09-28

## 2018-09-28 PROCEDURE — 99213 OFFICE O/P EST LOW 20 MIN: CPT | Performed by: INTERNAL MEDICINE

## 2018-09-28 NOTE — ASSESSMENT & PLAN NOTE
Compliance data is reviewed  Patient is requiring high pressures and AHI is 1/hour  Minimal leak is noted  Patient is compliant with the machine 100% usage on average 6 hours per night  Will decrease pressure range from 6-12 and recheck data to assess AHI if still remains within normal range then will maintain on lower pressure range as she is having difficulty tolerating higher pressures

## 2018-09-28 NOTE — PROGRESS NOTES
Assessment/Plan:     Problem List Items Addressed This Visit        Respiratory    LIBORIO (obstructive sleep apnea) - Primary     Compliance data is reviewed  Patient is requiring high pressures and AHI is 1/hour  Minimal leak is noted  Patient is compliant with the machine 100% usage on average 6 hours per night  Will decrease pressure range from 6-12 and recheck data to assess AHI if still remains within normal range then will maintain on lower pressure range as she is having difficulty tolerating higher pressures  Relevant Orders    PAP DME Pressure Change             Follow-up in 3 months  All questions are answered to the patient's satisfaction and understanding  She verbalizes understanding  She is encouraged to call with any further questions or concerns  Portions of the record may have been created with voice recognition software  Occasional wrong word or "sound a like" substitutions may have occurred due to the inherent limitations of voice recognition software  Read the chart carefully and recognize, using context, where substitutions have occurred  Electronically Signed by Aleksandar Ramirez MD    ______________________________________________________________________    Chief Complaint:   Chief Complaint   Patient presents with    Follow-up     2m    Sleep Apnea     compliance  Pt states that machine is working okay  She is still getting use to it  Patient ID: Osman Green is a 51 y o  y o  female has no past medical history on file  9/28/2018  Patient presents today for follow-up visit  Her snoring and witnessed apneas have resolved  She however does not feel like she is getting a more restful sleep  She is still trying to get used to the CPAP machine  She feels like the pressure may be too high for her and feels bloated in the morning with a dry mouth  Denies any difference in her daytime symptoms  Review of Systems   Cardiovascular: Positive for leg swelling     All other systems reviewed and are negative  Smoking history: She reports that she has never smoked  She has never used smokeless tobacco       Immunization History   Administered Date(s) Administered    Tdap 08/11/2011     Current Outpatient Prescriptions   Medication Sig Dispense Refill    acetaminophen (TYLENOL) 325 mg tablet Take 650 mg by mouth every 6 (six) hours as needed for mild pain      buPROPion (WELLBUTRIN XL) 150 mg 24 hr tablet Take 1 tablet (150 mg total) by mouth daily 30 tablet 4    levofloxacin (QUIXIN) 0 5 % ophthalmic solution Two drops in the right eye every 2 hours while awake for 2 days then 2 drops every 4-6 hours for 5 days  5 mL 0    multivitamin (THERAGRAN) TABS Take 1 tablet by mouth daily      Omega 3 1200 MG CAPS Take by mouth       No current facility-administered medications for this visit  Allergies: Patient has no known allergies  Objective:  Vitals:    09/28/18 0755   BP: 110/76   BP Location: Right arm   Patient Position: Sitting   Cuff Size: Standard   Pulse: 67   Resp: 12   Temp: 98 6 °F (37 °C)   TempSrc: Oral   SpO2: 98%   Weight: 111 kg (244 lb)   Height: 5' 5" (1 651 m)   Oxygen Therapy  SpO2: 98 %    Wt Readings from Last 3 Encounters:   09/28/18 111 kg (244 lb)   07/21/18 104 kg (230 lb)   07/03/18 104 kg (230 lb)     Body mass index is 40 6 kg/m²  Physical Exam   Constitutional: She is oriented to person, place, and time  She appears well-developed and well-nourished  No distress  HENT:   Head: Normocephalic and atraumatic  Mouth/Throat: Oropharynx is clear and moist  No oropharyngeal exudate  Eyes: Pupils are equal, round, and reactive to light  EOM are normal    Neck: Normal range of motion  Neck supple  Cardiovascular: Normal rate and regular rhythm  No murmur heard  Pulmonary/Chest: Effort normal and breath sounds normal  No respiratory distress  She has no wheezes  She has no rales  She exhibits no tenderness  Abdominal: Soft   Bowel sounds are normal  She exhibits no distension  There is no tenderness  Musculoskeletal: Normal range of motion  She exhibits no edema  Lymphadenopathy:     She has no cervical adenopathy  Neurological: She is alert and oriented to person, place, and time  No cranial nerve deficit  Skin: Skin is warm and dry  She is not diaphoretic  Psychiatric: She has a normal mood and affect  Her behavior is normal    Vitals reviewed

## 2018-09-28 NOTE — LETTER
September 28, 2018     Julieta Mendenhall, [id number] [address]    Patient: Tim Estes   YOB: 1970   Date of Visit: 9/28/2018       Dear Dr Barrie Romero: Thank you for referring Tim Estes to me for evaluation  Below are my notes for this consultation  If you have questions, please do not hesitate to call me  I look forward to following your patient along with you  Sincerely,        Lurdes Hanson MD        CC: No Recipients  Lurdes Hanson MD  9/28/2018  8:19 AM  Sign at close encounter  Assessment/Plan:     Problem List Items Addressed This Visit        Respiratory    LIBORIO (obstructive sleep apnea) - Primary     Compliance data is reviewed  Patient is requiring high pressures and AHI is 1/hour  Minimal leak is noted  Patient is compliant with the machine 100% usage on average 6 hours per night  Will decrease pressure range from 6-12 and recheck data to assess AHI if still remains within normal range then will maintain on lower pressure range as she is having difficulty tolerating higher pressures  Relevant Orders    PAP DME Pressure Change             Follow-up in 3 months  All questions are answered to the patient's satisfaction and understanding  She verbalizes understanding  She is encouraged to call with any further questions or concerns  Portions of the record may have been created with voice recognition software  Occasional wrong word or "sound a like" substitutions may have occurred due to the inherent limitations of voice recognition software  Read the chart carefully and recognize, using context, where substitutions have occurred  Electronically Signed by Lurdes Hanson MD    ______________________________________________________________________    Chief Complaint:   Chief Complaint   Patient presents with    Follow-up     2m    Sleep Apnea     compliance  Pt states that machine is working okay  She is still getting use to it  Patient ID: Nolvia Goldberg is a 51 y o  y o  female has no past medical history on file  9/28/2018  Patient presents today for follow-up visit  Her snoring and witnessed apneas have resolved  She however does not feel like she is getting a more restful sleep  She is still trying to get used to the CPAP machine  She feels like the pressure may be too high for her and feels bloated in the morning with a dry mouth  Denies any difference in her daytime symptoms  Review of Systems   Cardiovascular: Positive for leg swelling  All other systems reviewed and are negative  Smoking history: She reports that she has never smoked  She has never used smokeless tobacco       Immunization History   Administered Date(s) Administered    Tdap 08/11/2011     Current Outpatient Prescriptions   Medication Sig Dispense Refill    acetaminophen (TYLENOL) 325 mg tablet Take 650 mg by mouth every 6 (six) hours as needed for mild pain      buPROPion (WELLBUTRIN XL) 150 mg 24 hr tablet Take 1 tablet (150 mg total) by mouth daily 30 tablet 4    levofloxacin (QUIXIN) 0 5 % ophthalmic solution Two drops in the right eye every 2 hours while awake for 2 days then 2 drops every 4-6 hours for 5 days  5 mL 0    multivitamin (THERAGRAN) TABS Take 1 tablet by mouth daily      Omega 3 1200 MG CAPS Take by mouth       No current facility-administered medications for this visit  Allergies: Patient has no known allergies  Objective:  Vitals:    09/28/18 0755   BP: 110/76   BP Location: Right arm   Patient Position: Sitting   Cuff Size: Standard   Pulse: 67   Resp: 12   Temp: 98 6 °F (37 °C)   TempSrc: Oral   SpO2: 98%   Weight: 111 kg (244 lb)   Height: 5' 5" (1 651 m)   Oxygen Therapy  SpO2: 98 %    Wt Readings from Last 3 Encounters:   09/28/18 111 kg (244 lb)   07/21/18 104 kg (230 lb)   07/03/18 104 kg (230 lb)     Body mass index is 40 6 kg/m²      Physical Exam   Constitutional: She is oriented to person, place, and time  She appears well-developed and well-nourished  No distress  HENT:   Head: Normocephalic and atraumatic  Mouth/Throat: Oropharynx is clear and moist  No oropharyngeal exudate  Eyes: Pupils are equal, round, and reactive to light  EOM are normal    Neck: Normal range of motion  Neck supple  Cardiovascular: Normal rate and regular rhythm  No murmur heard  Pulmonary/Chest: Effort normal and breath sounds normal  No respiratory distress  She has no wheezes  She has no rales  She exhibits no tenderness  Abdominal: Soft  Bowel sounds are normal  She exhibits no distension  There is no tenderness  Musculoskeletal: Normal range of motion  She exhibits no edema  Lymphadenopathy:     She has no cervical adenopathy  Neurological: She is alert and oriented to person, place, and time  No cranial nerve deficit  Skin: Skin is warm and dry  She is not diaphoretic  Psychiatric: She has a normal mood and affect  Her behavior is normal    Vitals reviewed

## 2019-01-21 ENCOUNTER — OFFICE VISIT (OUTPATIENT)
Dept: FAMILY MEDICINE CLINIC | Facility: CLINIC | Age: 49
End: 2019-01-21
Payer: COMMERCIAL

## 2019-01-21 VITALS
BODY MASS INDEX: 40.48 KG/M2 | HEIGHT: 65 IN | SYSTOLIC BLOOD PRESSURE: 120 MMHG | RESPIRATION RATE: 14 BRPM | WEIGHT: 243 LBS | HEART RATE: 78 BPM | DIASTOLIC BLOOD PRESSURE: 70 MMHG | TEMPERATURE: 100 F

## 2019-01-21 DIAGNOSIS — R68.89 FLU-LIKE SYMPTOMS: Primary | ICD-10-CM

## 2019-01-21 PROCEDURE — 99213 OFFICE O/P EST LOW 20 MIN: CPT | Performed by: FAMILY MEDICINE

## 2019-01-21 PROCEDURE — 3008F BODY MASS INDEX DOCD: CPT | Performed by: FAMILY MEDICINE

## 2019-01-21 RX ORDER — OSELTAMIVIR PHOSPHATE 75 MG/1
75 CAPSULE ORAL 2 TIMES DAILY
Qty: 10 CAPSULE | Refills: 0 | Status: SHIPPED | OUTPATIENT
Start: 2019-01-21 | End: 2019-01-26

## 2019-01-21 NOTE — PROGRESS NOTES
Chief Complaint   Patient presents with    Cough    Sore Throat    Fever        Patient ID: Finn Burrell is a 51 y o  female  HPI  Pt is seeing for flu like symptoms x 2 days -  T max this am 101 -  No direct sick contacts, did not have flu vaccine this season -  Tried OTC cold meds with minimal help     The following portions of the patient's history were reviewed and updated as appropriate: allergies, current medications, past family history, past medical history, past social history, past surgical history and problem list     Review of Systems   Constitutional: Positive for fever  Negative for chills  HENT: Positive for congestion, postnasal drip and sore throat  Negative for ear pain  Respiratory: Positive for cough  Negative for chest tightness, shortness of breath and wheezing  Cardiovascular: Negative  Gastrointestinal: Positive for diarrhea  Negative for abdominal pain, nausea and vomiting  Skin: Negative  Current Outpatient Prescriptions   Medication Sig Dispense Refill    multivitamin (THERAGRAN) TABS Take 1 tablet by mouth daily      Omega 3 1200 MG CAPS Take by mouth      acetaminophen (TYLENOL) 325 mg tablet Take 650 mg by mouth every 6 (six) hours as needed for mild pain      buPROPion (WELLBUTRIN XL) 150 mg 24 hr tablet Take 1 tablet (150 mg total) by mouth daily (Patient not taking: Reported on 1/21/2019 ) 30 tablet 4    levofloxacin (QUIXIN) 0 5 % ophthalmic solution Two drops in the right eye every 2 hours while awake for 2 days then 2 drops every 4-6 hours for 5 days  (Patient not taking: Reported on 1/21/2019 ) 5 mL 0     No current facility-administered medications for this visit  Objective:    /70 (BP Location: Left arm, Patient Position: Sitting, Cuff Size: Adult)   Pulse 78   Temp 100 °F (37 8 °C) (Temporal)   Resp 14   Ht 5' 5" (1 651 m)   Wt 110 kg (243 lb)   BMI 40 44 kg/m²        Physical Exam   Constitutional: No distress     HENT: Right Ear: Tympanic membrane normal    Left Ear: Tympanic membrane normal    Mouth/Throat: No oropharyngeal exudate  Eyes: Conjunctivae are normal    Pulmonary/Chest: Effort normal and breath sounds normal  No respiratory distress  She has no wheezes  She has no rales  Assessment/Plan:         Diagnoses and all orders for this visit:    Flu-like symptoms  -     oseltamivir (TAMIFLU) 75 mg capsule;  Take 1 capsule (75 mg total) by mouth 2 (two) times a day for 5 days        rto prn                     Miladys Cormier MD

## 2019-02-01 ENCOUNTER — OFFICE VISIT (OUTPATIENT)
Dept: PULMONOLOGY | Facility: MEDICAL CENTER | Age: 49
End: 2019-02-01
Payer: COMMERCIAL

## 2019-02-01 VITALS
TEMPERATURE: 98.5 F | OXYGEN SATURATION: 99 % | WEIGHT: 239 LBS | HEIGHT: 65 IN | SYSTOLIC BLOOD PRESSURE: 112 MMHG | HEART RATE: 64 BPM | BODY MASS INDEX: 39.82 KG/M2 | DIASTOLIC BLOOD PRESSURE: 70 MMHG | RESPIRATION RATE: 12 BRPM

## 2019-02-01 DIAGNOSIS — G47.33 OSA (OBSTRUCTIVE SLEEP APNEA): Primary | ICD-10-CM

## 2019-02-01 PROCEDURE — 99213 OFFICE O/P EST LOW 20 MIN: CPT | Performed by: INTERNAL MEDICINE

## 2019-02-01 NOTE — PROGRESS NOTES
Assessment/Plan:     Problem List Items Addressed This Visit        Respiratory    LIBORIO (obstructive sleep apnea) - Primary     Compliance data is reviewed today and patient is compliant with and benefitting from the use of her CPAP machine  AHI on settings of auto CPAP 6-12 cm of water is 1 2/hour  I do recommend that when she is to obtain her repeat supplies that she get nasal pillows in order to try to improve the comfort of the use of the machine  Continued compliance is encouraged  Periodic mask, tubing, filter replacement every 1-3 months is advised and periodic  approximately every year is advised  Uses CPAP during all sleep periods is discussed  Avoidance of sedatives, supine sleep, alcohol, narcotics in the setting of untreated sleep apnea is discussed  Absolute avoidance of driving while drowsy is also discussed  Avoid weight gain/encourage weight loss  Follow-up 1 year  All questions are answered to the patient's satisfaction and understanding  She verbalizes understanding  She is encouraged to call with any further questions or concerns  Portions of the record may have been created with voice recognition software  Occasional wrong word or "sound a like" substitutions may have occurred due to the inherent limitations of voice recognition software  Read the chart carefully and recognize, using context, where substitutions have occurred  Electronically Signed by Marilee Alaniz MD    ______________________________________________________________________    Chief Complaint:   Chief Complaint   Patient presents with    Sleep Apnea     CPAP compliance       Patient ID: Mohamud Gomez is a 51 y o  y o  female has no past medical history on file  2/1/2019  Patient presents today for follow-up visit  States that she is doing well on the lower pressures  She is not feeling bloated  No snoring or witnessed apneas are noted    She denies any difficulty with the mask or the machine at this time  She would like to try nasal pillows to improve the comfort of the use of her CPAP machine  She is working on weight loss  HPI    Review of Systems   All other systems reviewed and are negative  Smoking history: She reports that she has never smoked  She has never used smokeless tobacco       Immunization History   Administered Date(s) Administered    Tdap 08/11/2011     Current Outpatient Prescriptions   Medication Sig Dispense Refill    acetaminophen (TYLENOL) 325 mg tablet Take 650 mg by mouth every 6 (six) hours as needed for mild pain      multivitamin (THERAGRAN) TABS Take 1 tablet by mouth daily      Omega 3 1200 MG CAPS Take by mouth      buPROPion (WELLBUTRIN XL) 150 mg 24 hr tablet Take 1 tablet (150 mg total) by mouth daily (Patient not taking: Reported on 1/21/2019 ) 30 tablet 4    levofloxacin (QUIXIN) 0 5 % ophthalmic solution Two drops in the right eye every 2 hours while awake for 2 days then 2 drops every 4-6 hours for 5 days  (Patient not taking: Reported on 1/21/2019 ) 5 mL 0     No current facility-administered medications for this visit  Allergies: Patient has no known allergies  Objective:  Vitals:    02/01/19 1004   BP: 112/70   BP Location: Right arm   Patient Position: Sitting   Cuff Size: Large   Pulse: 64   Resp: 12   Temp: 98 5 °F (36 9 °C)   TempSrc: Tympanic   SpO2: 99%   Weight: 108 kg (239 lb)   Height: 5' 5" (1 651 m)   Oxygen Therapy  SpO2: 99 %    Wt Readings from Last 3 Encounters:   02/01/19 108 kg (239 lb)   01/21/19 110 kg (243 lb)   09/28/18 111 kg (244 lb)     Body mass index is 39 77 kg/m²  Physical Exam   Constitutional: She is oriented to person, place, and time  She appears well-developed and well-nourished  No distress  HENT:   Head: Atraumatic  Neck: Normal range of motion  Pulmonary/Chest: Effort normal    Neurological: She is alert and oriented to person, place, and time     Psychiatric: She has a normal mood and affect  Her behavior is normal    Vitals reviewed

## 2019-02-01 NOTE — ASSESSMENT & PLAN NOTE
Compliance data is reviewed today and patient is compliant with and benefitting from the use of her CPAP machine  AHI on settings of auto CPAP 6-12 cm of water is 1 2/hour  I do recommend that when she is to obtain her repeat supplies that she get nasal pillows in order to try to improve the comfort of the use of the machine  Continued compliance is encouraged  Periodic mask, tubing, filter replacement every 1-3 months is advised and periodic  approximately every year is advised  Uses CPAP during all sleep periods is discussed  Avoidance of sedatives, supine sleep, alcohol, narcotics in the setting of untreated sleep apnea is discussed  Absolute avoidance of driving while drowsy is also discussed  Avoid weight gain/encourage weight loss

## 2019-06-12 ENCOUNTER — OFFICE VISIT (OUTPATIENT)
Dept: FAMILY MEDICINE CLINIC | Facility: CLINIC | Age: 49
End: 2019-06-12
Payer: COMMERCIAL

## 2019-06-12 VITALS
SYSTOLIC BLOOD PRESSURE: 130 MMHG | TEMPERATURE: 97.7 F | RESPIRATION RATE: 14 BRPM | WEIGHT: 221.4 LBS | BODY MASS INDEX: 36.89 KG/M2 | HEART RATE: 80 BPM | DIASTOLIC BLOOD PRESSURE: 80 MMHG | HEIGHT: 65 IN

## 2019-06-12 DIAGNOSIS — N93.9 ABNORMAL UTERINE BLEEDING: ICD-10-CM

## 2019-06-12 DIAGNOSIS — E55.9 VITAMIN D DEFICIENCY: ICD-10-CM

## 2019-06-12 DIAGNOSIS — Z12.39 BREAST SCREENING, UNSPECIFIED: ICD-10-CM

## 2019-06-12 DIAGNOSIS — Z13.29 THYROID DISORDER SCREEN: ICD-10-CM

## 2019-06-12 DIAGNOSIS — Z12.11 ENCOUNTER FOR COLORECTAL CANCER SCREENING: ICD-10-CM

## 2019-06-12 DIAGNOSIS — Z13.0 SCREENING FOR DEFICIENCY ANEMIA: ICD-10-CM

## 2019-06-12 DIAGNOSIS — Z12.12 ENCOUNTER FOR COLORECTAL CANCER SCREENING: ICD-10-CM

## 2019-06-12 DIAGNOSIS — F41.9 ANXIETY: ICD-10-CM

## 2019-06-12 DIAGNOSIS — Z13.1 DIABETES MELLITUS SCREENING: ICD-10-CM

## 2019-06-12 DIAGNOSIS — Z00.00 ANNUAL PHYSICAL EXAM: Primary | ICD-10-CM

## 2019-06-12 DIAGNOSIS — Z13.220 LIPID SCREENING: ICD-10-CM

## 2019-06-12 DIAGNOSIS — Z12.4 ENCOUNTER FOR PAPANICOLAOU SMEAR OF CERVIX: ICD-10-CM

## 2019-06-12 PROCEDURE — 99396 PREV VISIT EST AGE 40-64: CPT | Performed by: NURSE PRACTITIONER

## 2019-06-17 LAB
CYTOLOGIST CVX/VAG CYTO: NORMAL
DX ICD CODE: NORMAL
HPV I/H RISK 1 DNA CVX QL PROBE+SIG AMP: NEGATIVE
OTHER STN SPEC: NORMAL
PATH REPORT.FINAL DX SPEC: NORMAL
SL AMB NOTE:: NORMAL
SL AMB SPECIMEN ADEQUACY: NORMAL
SL AMB TEST METHODOLOGY: NORMAL

## 2019-08-01 ENCOUNTER — HOSPITAL ENCOUNTER (OUTPATIENT)
Dept: RADIOLOGY | Facility: HOSPITAL | Age: 49
Discharge: HOME | End: 2019-08-01
Payer: COMMERCIAL

## 2019-08-01 VITALS — BODY MASS INDEX: 36.65 KG/M2 | HEIGHT: 65 IN | WEIGHT: 220 LBS

## 2019-08-01 DIAGNOSIS — N93.9 ABNORMAL UTERINE BLEEDING: ICD-10-CM

## 2019-08-01 DIAGNOSIS — Z00.00 ANNUAL PHYSICAL EXAM: ICD-10-CM

## 2019-08-01 DIAGNOSIS — Z12.39 BREAST SCREENING, UNSPECIFIED: ICD-10-CM

## 2019-08-01 PROCEDURE — 77067 SCR MAMMO BI INCL CAD: CPT

## 2019-08-01 PROCEDURE — 76830 TRANSVAGINAL US NON-OB: CPT

## 2019-08-01 PROCEDURE — 77063 BREAST TOMOSYNTHESIS BI: CPT

## 2019-08-01 PROCEDURE — 76856 US EXAM PELVIC COMPLETE: CPT

## 2020-02-03 ENCOUNTER — OFFICE VISIT (OUTPATIENT)
Dept: PULMONOLOGY | Facility: MEDICAL CENTER | Age: 50
End: 2020-02-03
Payer: COMMERCIAL

## 2020-02-03 VITALS
HEART RATE: 63 BPM | SYSTOLIC BLOOD PRESSURE: 130 MMHG | TEMPERATURE: 98.6 F | HEIGHT: 65 IN | RESPIRATION RATE: 12 BRPM | WEIGHT: 233 LBS | BODY MASS INDEX: 38.82 KG/M2 | OXYGEN SATURATION: 93 % | DIASTOLIC BLOOD PRESSURE: 92 MMHG

## 2020-02-03 DIAGNOSIS — E66.9 OBESITY (BMI 35.0-39.9 WITHOUT COMORBIDITY): ICD-10-CM

## 2020-02-03 DIAGNOSIS — G47.33 OSA (OBSTRUCTIVE SLEEP APNEA): Primary | ICD-10-CM

## 2020-02-03 PROCEDURE — 3075F SYST BP GE 130 - 139MM HG: CPT | Performed by: INTERNAL MEDICINE

## 2020-02-03 PROCEDURE — 99213 OFFICE O/P EST LOW 20 MIN: CPT | Performed by: INTERNAL MEDICINE

## 2020-02-03 PROCEDURE — 1036F TOBACCO NON-USER: CPT | Performed by: INTERNAL MEDICINE

## 2020-02-03 PROCEDURE — 3080F DIAST BP >= 90 MM HG: CPT | Performed by: INTERNAL MEDICINE

## 2020-02-03 PROCEDURE — 3008F BODY MASS INDEX DOCD: CPT | Performed by: INTERNAL MEDICINE

## 2020-02-03 NOTE — ASSESSMENT & PLAN NOTE
I did encourage her to lose weight  I told her that she can lose 30 lb or more lb there is a possible she would not need CPAP therapy

## 2020-02-03 NOTE — PROGRESS NOTES
Assessment/Plan        Problem List Items Addressed This Visit        Respiratory    LIBORIO (obstructive sleep apnea) - Primary     Mild obstructive sleep apnea with good compliance to CPAP therapy using fullface mask  She is present set on auto CPAP with pressure range of 6-12 cm water  I did reveal compliance data from the past 1 month with her  Her resulting AHI of 0 9 which is satisfactory  Her average CPAP pressure was almost 12 cm water  I did discuss the diagnosis and treatment of LIBORIO with her  I did encourage her to lose weight  She is doing well on her present setting of 6-12 cm of auto CPAP so will maintain this pressure  Follow-up in 1 year         Relevant Orders    PAP DME Resupply/Reorder       Other    Obesity (BMI 35 0-39 9 without comorbidity)     I did encourage her to lose weight  I told her that she can lose 30 lb or more lb there is a possible she would not need CPAP therapy  CC:  Some fatigue at times    HPI    Femi Paul presents for follow up of her mild LIBORIO  SHe is doing well with the CPAP which is set at auto CPAP with pressure range of 6-12 cm water  She does use a full face mask interface  She denies any nocturnal dyspnea and she generally feels well rested in the morning after using her CPAP  Prior to use of the CPAP she did have snoring and would have some daytime fatigue  She denies any history of high blood pressure or heart disease  She presently is not taking any medication  No recent weight change  No history of asthma    Her initial diagnostic in-lab sleep study was done June 21, 2018 for evaluation of snoring and fatigue  This showed 5 obstructive apneas and 25 hypopneas for overall AHI of 5 8 which shows mild LIBORIO  She said mostly in her left right side  Mean O2 saturation was 97% with alyce of 76%  She spent 5 minutes less than 90%          Past Medical History:   Diagnosis Date    Abnormal mammogram of left breast     Anxiety     Dense breast  Menstrual irregularity     LIBORIO (obstructive sleep apnea)     Snoring     Vitamin D deficiency        Past Surgical History:   Procedure Laterality Date     SECTION           Current Outpatient Medications:     acetaminophen (TYLENOL) 325 mg tablet, Take 650 mg by mouth every 6 (six) hours as needed for mild pain, Disp: , Rfl:     buPROPion (WELLBUTRIN XL) 150 mg 24 hr tablet, Take 1 tablet (150 mg total) by mouth daily (Patient not taking: Reported on 2019 ), Disp: 30 tablet, Rfl: 4    levofloxacin (QUIXIN) 0 5 % ophthalmic solution, Two drops in the right eye every 2 hours while awake for 2 days then 2 drops every 4-6 hours for 5 days  (Patient not taking: Reported on 2019 ), Disp: 5 mL, Rfl: 0    multivitamin (THERAGRAN) TABS, Take 1 tablet by mouth daily, Disp: , Rfl:     Omega 3 1200 MG CAPS, Take by mouth, Disp: , Rfl:     No Known Allergies    Social History     Tobacco Use    Smoking status: Never Smoker    Smokeless tobacco: Never Used   Substance Use Topics    Alcohol use: Yes     Comment: socially         Family History   Problem Relation Age of Onset    Diabetes Father     Hypertension Father     Prostate cancer Father     No Known Problems Mother     Skin cancer Sister     No Known Problems Daughter     Uterine cancer Maternal Grandmother     No Known Problems Paternal Grandmother     No Known Problems Sister     No Known Problems Sister     No Known Problems Sister        Review of Systems   Constitutional: Negative for chills, fever and unexpected weight change  HENT: Negative for congestion, rhinorrhea and sore throat  Eyes: Negative for discharge and redness  Cardiovascular: Negative for chest pain, palpitations and leg swelling  Gastrointestinal: Negative for abdominal distention, abdominal pain and nausea  Endocrine: Negative for polydipsia and polyphagia  Genitourinary: Negative for dysuria     Musculoskeletal: Negative for joint swelling and myalgias  Skin: Negative for rash  Neurological: Negative for light-headedness  Vitals:    02/03/20 0755   BP: 130/92   Pulse: 63   Resp: 12   Temp: 98 6 °F (37 °C)   SpO2: 93%           Physical Exam   Constitutional: She is oriented to person, place, and time  She appears well-developed and well-nourished  No distress  Patient is overweight   HENT:   Head: Normocephalic  Nose: Nose normal    Mouth/Throat: Oropharynx is clear and moist  No oropharyngeal exudate  Mallampatti score is 3   Eyes: Pupils are equal, round, and reactive to light  Conjunctivae are normal    Cardiovascular: Normal rate, regular rhythm and normal heart sounds  Pulmonary/Chest: Effort normal    Lungs are clear, no wheezes, crackles, rhonchi   Abdominal: Soft  She exhibits no distension  There is no tenderness  Musculoskeletal:   No edema, clubbing, cyanosis   Neurological: She is alert and oriented to person, place, and time  Skin: Skin is warm and dry  Psychiatric: She has a normal mood and affect

## 2020-02-03 NOTE — ASSESSMENT & PLAN NOTE
Mild obstructive sleep apnea with good compliance to CPAP therapy using fullface mask  She is present set on auto CPAP with pressure range of 6-12 cm water  I did reveal compliance data from the past 1 month with her  Her resulting AHI of 0 9 which is satisfactory  Her average CPAP pressure was almost 12 cm water  I did discuss the diagnosis and treatment of LIBORIO with her  I did encourage her to lose weight  She is doing well on her present setting of 6-12 cm of auto CPAP so will maintain this pressure      Follow-up in 1 year

## 2020-10-05 ENCOUNTER — TELEPHONE (OUTPATIENT)
Dept: FAMILY MEDICINE CLINIC | Facility: CLINIC | Age: 50
End: 2020-10-05

## 2020-10-10 ENCOUNTER — OFFICE VISIT (OUTPATIENT)
Dept: FAMILY MEDICINE CLINIC | Facility: CLINIC | Age: 50
End: 2020-10-10
Payer: COMMERCIAL

## 2020-10-10 VITALS
SYSTOLIC BLOOD PRESSURE: 126 MMHG | RESPIRATION RATE: 18 BRPM | OXYGEN SATURATION: 98 % | BODY MASS INDEX: 40.52 KG/M2 | WEIGHT: 243.2 LBS | HEIGHT: 65 IN | HEART RATE: 77 BPM | TEMPERATURE: 98.5 F | DIASTOLIC BLOOD PRESSURE: 82 MMHG

## 2020-10-10 DIAGNOSIS — Z12.11 COLON CANCER SCREENING: Primary | ICD-10-CM

## 2020-10-10 DIAGNOSIS — R93.5 ABNORMAL ULTRASOUND OF ENDOMETRIUM: ICD-10-CM

## 2020-10-10 DIAGNOSIS — Z13.0 SCREENING FOR DEFICIENCY ANEMIA: ICD-10-CM

## 2020-10-10 DIAGNOSIS — N93.9 ABNORMAL UTERINE BLEEDING: ICD-10-CM

## 2020-10-10 DIAGNOSIS — Z13.1 DIABETES MELLITUS SCREENING: ICD-10-CM

## 2020-10-10 DIAGNOSIS — Z12.4 ENCOUNTER FOR PAPANICOLAOU SMEAR FOR CERVICAL CANCER SCREENING: ICD-10-CM

## 2020-10-10 DIAGNOSIS — Z13.29 THYROID DISORDER SCREEN: ICD-10-CM

## 2020-10-10 DIAGNOSIS — Z13.220 LIPID SCREENING: ICD-10-CM

## 2020-10-10 DIAGNOSIS — Z23 NEED FOR INFLUENZA VACCINATION: ICD-10-CM

## 2020-10-10 DIAGNOSIS — Z00.00 ANNUAL PHYSICAL EXAM: ICD-10-CM

## 2020-10-10 DIAGNOSIS — Z12.31 ENCOUNTER FOR SCREENING MAMMOGRAM FOR MALIGNANT NEOPLASM OF BREAST: ICD-10-CM

## 2020-10-10 PROCEDURE — 99396 PREV VISIT EST AGE 40-64: CPT | Performed by: NURSE PRACTITIONER

## 2020-10-10 PROCEDURE — 90471 IMMUNIZATION ADMIN: CPT | Performed by: NURSE PRACTITIONER

## 2020-10-10 PROCEDURE — 3725F SCREEN DEPRESSION PERFORMED: CPT | Performed by: NURSE PRACTITIONER

## 2020-10-10 PROCEDURE — 90682 RIV4 VACC RECOMBINANT DNA IM: CPT | Performed by: NURSE PRACTITIONER

## 2020-10-10 PROCEDURE — 1036F TOBACCO NON-USER: CPT | Performed by: NURSE PRACTITIONER

## 2020-10-20 ENCOUNTER — HOSPITAL ENCOUNTER (OUTPATIENT)
Dept: RADIOLOGY | Facility: HOSPITAL | Age: 50
Discharge: HOME | End: 2020-10-20
Payer: COMMERCIAL

## 2020-10-20 DIAGNOSIS — N93.9 ABNORMAL UTERINE BLEEDING: ICD-10-CM

## 2020-10-20 DIAGNOSIS — R93.5 ABNORMAL ULTRASOUND OF ENDOMETRIUM: ICD-10-CM

## 2020-10-20 DIAGNOSIS — Z12.11 COLON CANCER SCREENING: Primary | ICD-10-CM

## 2020-10-20 PROCEDURE — 76830 TRANSVAGINAL US NON-OB: CPT

## 2020-10-20 PROCEDURE — 76856 US EXAM PELVIC COMPLETE: CPT

## 2020-11-17 ENCOUNTER — TELEPHONE (OUTPATIENT)
Dept: FAMILY MEDICINE CLINIC | Facility: CLINIC | Age: 50
End: 2020-11-17

## 2020-12-01 ENCOUNTER — HOSPITAL ENCOUNTER (OUTPATIENT)
Dept: RADIOLOGY | Facility: HOSPITAL | Age: 50
Discharge: HOME | End: 2020-12-01
Payer: COMMERCIAL

## 2020-12-01 VITALS — HEIGHT: 65 IN | BODY MASS INDEX: 40.82 KG/M2 | WEIGHT: 245 LBS

## 2020-12-01 DIAGNOSIS — Z12.31 ENCOUNTER FOR SCREENING MAMMOGRAM FOR MALIGNANT NEOPLASM OF BREAST: ICD-10-CM

## 2020-12-01 PROCEDURE — 77067 SCR MAMMO BI INCL CAD: CPT

## 2020-12-01 PROCEDURE — 77063 BREAST TOMOSYNTHESIS BI: CPT

## 2021-01-07 ENCOUNTER — HOSPITAL ENCOUNTER (OUTPATIENT)
Dept: RADIOLOGY | Facility: HOSPITAL | Age: 51
Discharge: HOME | End: 2021-01-07
Payer: COMMERCIAL

## 2021-01-07 VITALS — WEIGHT: 245 LBS | HEIGHT: 65 IN | BODY MASS INDEX: 40.82 KG/M2

## 2021-01-07 DIAGNOSIS — R92.8 ABNORMAL SCREENING MAMMOGRAM: ICD-10-CM

## 2021-01-07 PROCEDURE — 76642 ULTRASOUND BREAST LIMITED: CPT

## 2021-01-07 PROCEDURE — G0279 TOMOSYNTHESIS, MAMMO: HCPCS

## 2021-01-07 PROCEDURE — 77065 DX MAMMO INCL CAD UNI: CPT

## 2021-01-11 ENCOUNTER — TELEPHONE (OUTPATIENT)
Dept: FAMILY MEDICINE CLINIC | Facility: CLINIC | Age: 51
End: 2021-01-11

## 2021-04-19 ENCOUNTER — TELEPHONE (OUTPATIENT)
Dept: FAMILY MEDICINE CLINIC | Facility: CLINIC | Age: 51
End: 2021-04-19

## 2021-04-20 ENCOUNTER — TELEPHONE (OUTPATIENT)
Dept: FAMILY MEDICINE CLINIC | Facility: CLINIC | Age: 51
End: 2021-04-20

## 2022-01-18 ENCOUNTER — OFFICE VISIT (OUTPATIENT)
Dept: PULMONOLOGY | Facility: MEDICAL CENTER | Age: 52
End: 2022-01-18
Payer: COMMERCIAL

## 2022-01-18 VITALS
HEIGHT: 65 IN | HEART RATE: 70 BPM | TEMPERATURE: 98.7 F | DIASTOLIC BLOOD PRESSURE: 78 MMHG | WEIGHT: 239 LBS | BODY MASS INDEX: 39.82 KG/M2 | SYSTOLIC BLOOD PRESSURE: 124 MMHG | RESPIRATION RATE: 12 BRPM | OXYGEN SATURATION: 98 %

## 2022-01-18 DIAGNOSIS — G47.33 OSA (OBSTRUCTIVE SLEEP APNEA): Primary | ICD-10-CM

## 2022-01-18 DIAGNOSIS — E66.9 OBESITY (BMI 35.0-39.9 WITHOUT COMORBIDITY): ICD-10-CM

## 2022-01-18 PROCEDURE — 1036F TOBACCO NON-USER: CPT | Performed by: INTERNAL MEDICINE

## 2022-01-18 PROCEDURE — 3008F BODY MASS INDEX DOCD: CPT | Performed by: INTERNAL MEDICINE

## 2022-01-18 PROCEDURE — 99213 OFFICE O/P EST LOW 20 MIN: CPT | Performed by: INTERNAL MEDICINE

## 2022-01-18 RX ORDER — CHLORHEXIDINE GLUCONATE 0.12 MG/ML
RINSE ORAL
COMMUNITY
Start: 2021-11-29 | End: 2022-01-18 | Stop reason: ALTCHOICE

## 2022-01-18 NOTE — PATIENT INSTRUCTIONS
Call our office early June of 2023 and we can place order for new auto CPAP machine and then you can have a compliance visit 30-60 days afterwards    Contact our office if you need new supplies    Backline:  [phone]

## 2022-01-18 NOTE — PROGRESS NOTES
Answers for HPI/ROS submitted by the patient on 1/18/2022  Have you had a change in appetite?: No  Do you have chest pain?: No  Do you have shortness of breath that occurs with effort or exertion?: No  Do you have ear congestion?: No  Do you have ear pain?: No  Do you have a fever?: No  Do you have headaches?: No  Do you have heartburn?: No  Do you have fatigue?: No  Do you have muscle pain?: No  Do you have nasal congestion?: No  Do you have shortness of breath when lying flat?: No  Do you have shortness of breath when you wake up?: No  Do you have post-nasal drip?: No  Do you have a runny nose?: No  Do you have sneezing?: No  Do you have a sore throat?: No  Do you have sweats?: No  Do you have trouble swallowing?: No  Have you experienced weight loss?: No  Which of the following makes your symptoms worse?: nothing  Which of the following makes your symptoms better?: nothing Azithromycin Pregnancy And Lactation Text: This medication is considered safe during pregnancy and is also secreted in breast milk.

## 2022-01-18 NOTE — PROGRESS NOTES
Assessment/Plan        Problem List Items Addressed This Visit        Respiratory    LIBORIO (obstructive sleep apnea) - Primary     Mild LIBORIO with good compliance and benefit to CPAP therapy  She has a Resmed Airview CPAP machine that was issued around July of 2018  Told she likely will be eligible for new machine around July of 2023  Her DME is [facility]  I reviewed compliance data with her  Her CPAP machine is set on pressure of 6-12 cm water and her average CPAP pressure was 11 9 cm which resulted in AHI of 0 8 which is good  She used to CPAP every night for average of 7 hours per night  Will continue with CPAP at pressure range of 6-12 cm water  Does using medium-size AirFit F30 mask which she likes            Other    Obesity (BMI 35 0-39 9 without comorbidity)     We did talk about diet and weight loss  Her weight today is summer to when she had her diagnostic sleep study  That time she weighed 234 lb and today she weighs 239 lb                 Sleep Apnea, doing okay, not having daytime sleepiness      HPI     Jil Romero has mild LIBORIO and has been on CPAP therapy since around July of 2018  Her DME company is [employer]  She uses a medium AirFit F20 fullface mass which she likes  She no longer snores with using CPAP and she feels well rested in the morning  No nocturnal dyspnea  She is not have any shortness of breath or nocturnal dyspnea  No excessive daytime somnolence  No shortness of breath with activity  Her DME company is [employer]   She will be due for a new CPAP machine around June to July 2023  Presently has a Resmed Airview CPAP machine as function well    She has been vaccinated for COVID  She does work from home and is a  for [employer]  No history of hypertension, diabetes mellitus or heart disease       Past Medical History:   Diagnosis Date    Abnormal mammogram of left breast     Anxiety     Dense breast     Menstrual irregularity  LIBORIO (obstructive sleep apnea)     Snoring     Vitamin D deficiency        Past Surgical History:   Procedure Laterality Date    BREAST BIOPSY Left     age 24s benign     SECTION      had 2 C sections         Current Outpatient Medications:     multivitamin (THERAGRAN) TABS, Take 1 tablet by mouth daily, Disp: , Rfl:     Omega 3 1200 MG CAPS, Take by mouth, Disp: , Rfl:     No Known Allergies    Social History     Tobacco Use    Smoking status: Never Smoker    Smokeless tobacco: Never Used   Substance Use Topics    Alcohol use: Yes     Comment: socially         Family History   Problem Relation Age of Onset    Diabetes Father     Hypertension Father     Prostate cancer Father     No Known Problems Mother     Skin cancer Sister     No Known Problems Daughter     Uterine cancer Maternal Grandmother     No Known Problems Paternal Grandmother     No Known Problems Sister     No Known Problems Sister     No Known Problems Sister        Review of Systems   Constitutional: Negative for appetite change and fever  HENT: Negative for ear pain, postnasal drip, rhinorrhea, sneezing, sore throat and trouble swallowing  Eyes: Negative for pain and redness  Respiratory: Negative for cough and shortness of breath  Cardiovascular: Negative for chest pain  Genitourinary: Negative for hematuria  Musculoskeletal: Negative for myalgias  Neurological: Negative for headaches  Psychiatric/Behavioral: Negative for confusion  Vitals:    22 1318   BP: 124/78   Pulse: 70   Resp: 12   Temp: 98 7 °F (37 1 °C)   SpO2: 98%     Height is 5 ft 5 in tall weight 239 lb BMI 39 77      Physical Exam  Constitutional:       General: She is not in acute distress  Appearance: Normal appearance  She is well-developed  Comments: Overweight   HENT:      Head: Normocephalic        Right Ear: External ear normal       Left Ear: External ear normal       Nose: Nose normal       Mouth/Throat: Mouth: Mucous membranes are moist       Pharynx: Oropharynx is clear  No oropharyngeal exudate  Comments: Mallampati score 2  Eyes:      Conjunctiva/sclera: Conjunctivae normal       Pupils: Pupils are equal, round, and reactive to light  Cardiovascular:      Rate and Rhythm: Normal rate and regular rhythm  Heart sounds: Normal heart sounds  Pulmonary:      Effort: Pulmonary effort is normal       Comments: Lung sounds are clear  No wheezes, crackles or rhonchi  Abdominal:      General: There is no distension  Palpations: Abdomen is soft  Tenderness: There is no abdominal tenderness  Musculoskeletal:      Cervical back: Neck supple  Comments: No edema, cyanosis clubbing   Lymphadenopathy:      Cervical: No cervical adenopathy  Skin:     General: Skin is warm and dry  Neurological:      Mental Status: She is alert and oriented to person, place, and time  Psychiatric:         Mood and Affect: Mood normal          Behavior: Behavior normal            Office Spirometry Results:        Answers for HPI/ROS submitted by the patient on 1/18/2022  Do you have shortness of breath that occurs with effort or exertion?: No  Do you have ear congestion?: No  Do you have heartburn?: No  Do you have fatigue?: No  Do you have nasal congestion?: No  Do you have shortness of breath when lying flat?: No  Do you have shortness of breath when you wake up?: No  Do you have sweats?: No  Have you experienced weight loss?: No  Which of the following makes your symptoms worse?: nothing  Which of the following makes your symptoms better?: nothing

## 2022-01-19 NOTE — ASSESSMENT & PLAN NOTE
We did talk about diet and weight loss  Her weight today is summer to when she had her diagnostic sleep study    That time she weighed 234 lb and today she weighs 239 lb

## 2022-01-19 NOTE — ASSESSMENT & PLAN NOTE
Mild LIBORIO with good compliance and benefit to CPAP therapy  She has a Resmed Airview CPAP machine that was issued around July of 2018  Told she likely will be eligible for new machine around July of 2023  Her DME is [facility]  I reviewed compliance data with her  Her CPAP machine is set on pressure of 6-12 cm water and her average CPAP pressure was 11 9 cm which resulted in AHI of 0 8 which is good  She used to CPAP every night for average of 7 hours per night  Will continue with CPAP at pressure range of 6-12 cm water    Does using medium-size AirFit F30 mask which she likes

## 2022-02-07 ENCOUNTER — OFFICE VISIT (OUTPATIENT)
Dept: FAMILY MEDICINE CLINIC | Facility: CLINIC | Age: 52
End: 2022-02-07
Payer: COMMERCIAL

## 2022-02-07 VITALS
BODY MASS INDEX: 39.42 KG/M2 | SYSTOLIC BLOOD PRESSURE: 122 MMHG | DIASTOLIC BLOOD PRESSURE: 88 MMHG | OXYGEN SATURATION: 97 % | HEART RATE: 71 BPM | WEIGHT: 236.6 LBS | HEIGHT: 65 IN | TEMPERATURE: 97.9 F | RESPIRATION RATE: 18 BRPM

## 2022-02-07 DIAGNOSIS — Z12.31 ENCOUNTER FOR SCREENING MAMMOGRAM FOR MALIGNANT NEOPLASM OF BREAST: ICD-10-CM

## 2022-02-07 DIAGNOSIS — Z00.00 HEALTH CARE MAINTENANCE: Primary | ICD-10-CM

## 2022-02-07 PROCEDURE — 3725F SCREEN DEPRESSION PERFORMED: CPT | Performed by: NURSE PRACTITIONER

## 2022-02-07 PROCEDURE — 99396 PREV VISIT EST AGE 40-64: CPT | Performed by: NURSE PRACTITIONER

## 2022-02-07 NOTE — PROGRESS NOTES
ADULT ANNUAL PHYSICAL  [facility]    NAME: Belkys May  AGE: 45 y o  SEX: female  : 1970     DATE: 2022     Assessment and Plan:     Problem List Items Addressed This Visit     None      Visit Diagnoses     Health care maintenance    -  Primary    Encounter for screening mammogram for malignant neoplasm of breast        Relevant Orders    Mammo screening bilateral w 3d & cad          Immunizations and preventive care screenings were discussed with patient today  Appropriate education was printed on patient's after visit summary  Counseling:  BMI Counseling: Body mass index is 39 37 kg/m²  Discussed the patient's BMI with her  The BMI is above average  BMI counseling and education was provided to the patient  Nutrition recommendations include reducing portion sizes, decreasing overall calorie intake, 3-5 servings of fruits/vegetables daily, reducing fast food intake, consuming healthier snacks and moderation in carbohydrate intake  Exercise recommendations include exercising 3-5 times per week  Dental Health: discussed importance of regular tooth brushing, flossing, and dental visits  Injury prevention: discussed safety/seat belts, safety helmets, smoke detectors, carbon dioxide detectors, and smoking near bedding or upholstery  · Sexual health: discussed sexually transmitted diseases, partner selection, use of condoms, avoidance of unintended pregnancy, and contraceptive alternatives  Return for lab draw  in office  Chief Complaint:     Chief Complaint   Patient presents with    Annual Exam      History of Present Illness:     Adult Annual Physical   Patient here for a comprehensive physical exam  The patient reports no problems  Diet and Physical Activity  · Diet/Nutrition: well balanced diet, limited junk food and low carb diet  · Exercise: 3-4 times a week on average        Depression Screening  PHQ-2/9 Depression Screening Little interest or pleasure in doing things: 0 - not at all  Feeling down, depressed, or hopeless: 0 - not at all  PHQ-2 Score: 0  PHQ-2 Interpretation: Negative depression screen       General Health  · Sleep: sleeps well  · Hearing: normal - bilateral   · Vision: no vision problems  · Dental: regular dental visits  /GYN Health  · Patient is: postmenopausal  · Next pap due      Review of Systems:     Review of Systems   Genitourinary:        Per hpi   Psychiatric/Behavioral: Negative for dysphoric mood, sleep disturbance and suicidal ideas  The patient is not nervous/anxious  All other systems reviewed and are negative       Past Medical History:     Past Medical History:   Diagnosis Date    Abnormal mammogram of left breast     Anxiety     Dense breast     Menstrual irregularity     LIBORIO (obstructive sleep apnea)     Snoring     Vitamin D deficiency       Past Surgical History:     Past Surgical History:   Procedure Laterality Date    BREAST BIOPSY Left     age 24s benign     SECTION      had 2 C sections      Social History:     Social History     Socioeconomic History    Marital status: /Civil Union     Spouse name: None    Number of children: None    Years of education: None    Highest education level: None   Occupational History    None   Tobacco Use    Smoking status: Never Smoker    Smokeless tobacco: Never Used   Substance and Sexual Activity    Alcohol use: Yes     Comment: socially    Drug use: No    Sexual activity: None   Other Topics Concern    None   Social History Narrative    Daily caffeinated coffee consumption     Social Determinants of Health     Financial Resource Strain: Not on file   Food Insecurity: Not on file   Transportation Needs: Not on file   Physical Activity: Not on file   Stress: Not on file   Social Connections: Not on file   Intimate Partner Violence: Not on file   Housing Stability: Not on file      Family History:     Family History   Problem Relation Age of Onset    Diabetes Father     Hypertension Father     Prostate cancer Father     No Known Problems Mother     Skin cancer Sister     No Known Problems Daughter     Uterine cancer Maternal Grandmother     No Known Problems Paternal Grandmother     No Known Problems Sister     No Known Problems Sister     No Known Problems Sister       Current Medications:     Current Outpatient Medications   Medication Sig Dispense Refill    multivitamin (THERAGRAN) TABS Take 1 tablet by mouth daily      Omega 3 1200 MG CAPS Take by mouth       No current facility-administered medications for this visit  Allergies:     No Known Allergies   Physical Exam:     /88 (BP Location: Left arm, Patient Position: Sitting, Cuff Size: Large)   Pulse 71   Temp 97 9 °F (36 6 °C)   Resp 18   Ht 5' 5" (1 651 m)   Wt 107 kg (236 lb 9 6 oz)   LMP 03/15/2019   SpO2 97%   BMI 39 37 kg/m²     Physical Exam  Vitals and nursing note reviewed  Constitutional:       General: She is not in acute distress  Appearance: Normal appearance  She is well-developed  HENT:      Head: Normocephalic and atraumatic  Eyes:      General: Lids are normal  Lids are everted, no foreign bodies appreciated  Conjunctiva/sclera: Conjunctivae normal       Pupils: Pupils are equal, round, and reactive to light  Neck:      Thyroid: No thyroid mass or thyromegaly  Vascular: No carotid bruit  Cardiovascular:      Rate and Rhythm: Normal rate and regular rhythm  Pulses: Normal pulses  Heart sounds: Normal heart sounds  No murmur heard  Pulmonary:      Effort: Pulmonary effort is normal  No respiratory distress  Breath sounds: Normal breath sounds  Abdominal:      General: Bowel sounds are normal       Palpations: Abdomen is soft  There is no hepatomegaly or splenomegaly  Tenderness: There is no abdominal tenderness  Genitourinary:     Exam position: Supine        Vagina: Normal       Cervix: No cervical motion tenderness, discharge or friability  Comments: Pap obtained  Musculoskeletal:      Right lower leg: No edema  Left lower leg: No edema  Lymphadenopathy:      Cervical: No cervical adenopathy  Lower Body: No right inguinal adenopathy  No left inguinal adenopathy  Skin:     General: Skin is warm and dry  Coloration: Skin is not pale  Neurological:      General: No focal deficit present  Mental Status: She is alert  Mental status is at baseline  Motor: No abnormal muscle tone     Psychiatric:         Mood and Affect: Mood normal          Behavior: Behavior normal          [facility], [address] [facility] [address]

## 2022-02-19 ENCOUNTER — OFFICE VISIT (OUTPATIENT)
Dept: FAMILY MEDICINE CLINIC | Facility: CLINIC | Age: 52
End: 2022-02-19
Payer: COMMERCIAL

## 2022-02-19 VITALS
WEIGHT: 214.8 LBS | DIASTOLIC BLOOD PRESSURE: 84 MMHG | HEART RATE: 68 BPM | HEIGHT: 61 IN | BODY MASS INDEX: 40.55 KG/M2 | SYSTOLIC BLOOD PRESSURE: 134 MMHG | RESPIRATION RATE: 16 BRPM | TEMPERATURE: 97.7 F

## 2022-02-19 DIAGNOSIS — Z13.1 DIABETES MELLITUS SCREENING: ICD-10-CM

## 2022-02-19 DIAGNOSIS — Z23 NEED FOR ZOSTER VACCINATION: ICD-10-CM

## 2022-02-19 DIAGNOSIS — E55.9 VITAMIN D DEFICIENCY: ICD-10-CM

## 2022-02-19 DIAGNOSIS — R73.01 ELEVATED FASTING BLOOD SUGAR: ICD-10-CM

## 2022-02-19 DIAGNOSIS — Z12.31 ENCOUNTER FOR SCREENING MAMMOGRAM FOR MALIGNANT NEOPLASM OF BREAST: Primary | ICD-10-CM

## 2022-02-19 DIAGNOSIS — Z13.220 LIPID SCREENING: ICD-10-CM

## 2022-02-19 DIAGNOSIS — Z13.0 SCREENING FOR DEFICIENCY ANEMIA: ICD-10-CM

## 2022-02-19 DIAGNOSIS — Z13.29 THYROID DISORDER SCREEN: ICD-10-CM

## 2022-02-19 DIAGNOSIS — Z79.899 DRUG THERAPY: ICD-10-CM

## 2022-02-19 PROCEDURE — 90471 IMMUNIZATION ADMIN: CPT | Performed by: NURSE PRACTITIONER

## 2022-02-19 PROCEDURE — 99212 OFFICE O/P EST SF 10 MIN: CPT | Performed by: NURSE PRACTITIONER

## 2022-02-19 PROCEDURE — 36415 COLL VENOUS BLD VENIPUNCTURE: CPT | Performed by: NURSE PRACTITIONER

## 2022-02-19 PROCEDURE — 3008F BODY MASS INDEX DOCD: CPT | Performed by: NURSE PRACTITIONER

## 2022-02-19 PROCEDURE — 90750 HZV VACC RECOMBINANT IM: CPT | Performed by: NURSE PRACTITIONER

## 2022-02-19 PROCEDURE — 1036F TOBACCO NON-USER: CPT | Performed by: NURSE PRACTITIONER

## 2022-02-19 NOTE — PROGRESS NOTES
Assessment/Plan:      Diagnoses and all orders for this visit:    Encounter for screening mammogram for malignant neoplasm of breast  -     Mammo screening bilateral w 3d & cad; Future    Need for zoster vaccination  -     Zoster Vaccine Recombinant IM    Vitamin D deficiency  -     Vitamin D 25 hydroxy; Future    Thyroid disorder screen  -     TSH, 3rd generation with Free T4 reflex; Future    Diabetes mellitus screening  -     Comprehensive metabolic panel; Future    Screening for deficiency anemia  -     CBC and Platelet; Future    Lipid screening  -     Lipid panel; Future    Elevated fasting blood sugar  -     Hemoglobin A1C; Future    Drug therapy  -     Comprehensive metabolic panel; Future  -     Lipid panel; Future  -     CBC and Platelet; Future  -     TSH, 3rd generation with Free T4 reflex; Future  -     Hemoglobin A1C; Future  -     Vitamin D 25 hydroxy; Future        Labs drawn w/o difficulty  Will call results    Subjective:     Patient ID: Ziyad Joseph is a 45 y o  female  Here for lab draw and shingles shot        Review of Systems   Constitutional: Negative  Objective:     Physical Exam  Vitals and nursing note reviewed  Constitutional:       General: She is not in acute distress  Appearance: Normal appearance  Neurological:      Mental Status: She is alert

## 2022-02-22 LAB
25(OH)D3+25(OH)D2 SERPL-MCNC: 20.3 NG/ML (ref 30–100)
ALBUMIN SERPL-MCNC: 4 G/DL (ref 3.8–4.9)
ALBUMIN/GLOB SERPL: 1.4 {RATIO} (ref 1.2–2.2)
ALP SERPL-CCNC: 59 IU/L (ref 44–121)
ALT SERPL-CCNC: 22 IU/L (ref 0–32)
AST SERPL-CCNC: 27 IU/L (ref 0–40)
BILIRUB SERPL-MCNC: <0.2 MG/DL (ref 0–1.2)
BUN SERPL-MCNC: 10 MG/DL (ref 6–24)
BUN/CREAT SERPL: 13 (ref 9–23)
CALCIUM SERPL-MCNC: 9.1 MG/DL (ref 8.7–10.2)
CHLORIDE SERPL-SCNC: 105 MMOL/L (ref 96–106)
CHOLEST SERPL-MCNC: 197 MG/DL (ref 100–199)
CO2 SERPL-SCNC: 23 MMOL/L (ref 20–29)
CREAT SERPL-MCNC: 0.8 MG/DL (ref 0.57–1)
ERYTHROCYTE [DISTWIDTH] IN BLOOD BY AUTOMATED COUNT: 13 % (ref 11.7–15.4)
GLOBULIN SER-MCNC: 2.8 G/DL (ref 1.5–4.5)
GLUCOSE SERPL-MCNC: 104 MG/DL (ref 65–99)
HBA1C MFR BLD: 5.3 % (ref 4.8–5.6)
HCT VFR BLD AUTO: 36.9 % (ref 34–46.6)
HDLC SERPL-MCNC: 49 MG/DL
HGB BLD-MCNC: 12.5 G/DL (ref 11.1–15.9)
LDLC SERPL CALC-MCNC: 132 MG/DL (ref 0–99)
MCH RBC QN AUTO: 27.9 PG (ref 26.6–33)
MCHC RBC AUTO-ENTMCNC: 33.9 G/DL (ref 31.5–35.7)
MCV RBC AUTO: 82 FL (ref 79–97)
MICRODELETION SYND BLD/T FISH: NORMAL
PLATELET # BLD AUTO: 262 X10E3/UL (ref 150–450)
POTASSIUM SERPL-SCNC: 4.6 MMOL/L (ref 3.5–5.2)
PROT SERPL-MCNC: 6.8 G/DL (ref 6–8.5)
RBC # BLD AUTO: 4.48 X10E6/UL (ref 3.77–5.28)
SL AMB EGFR AFRICAN AMERICAN: 98 ML/MIN/1.73
SL AMB EGFR NON AFRICAN AMERICAN: 85 ML/MIN/1.73
SL AMB VLDL CHOLESTEROL CALC: 16 MG/DL (ref 5–40)
SODIUM SERPL-SCNC: 140 MMOL/L (ref 134–144)
TRIGL SERPL-MCNC: 90 MG/DL (ref 0–149)
TSH SERPL DL<=0.005 MIU/L-ACNC: 1.59 UIU/ML (ref 0.45–4.5)
WBC # BLD AUTO: 6 X10E3/UL (ref 3.4–10.8)

## 2022-04-26 ENCOUNTER — OFFICE VISIT (OUTPATIENT)
Dept: FAMILY MEDICINE CLINIC | Facility: CLINIC | Age: 52
End: 2022-04-26
Payer: COMMERCIAL

## 2022-04-26 VITALS
BODY MASS INDEX: 34.67 KG/M2 | RESPIRATION RATE: 18 BRPM | SYSTOLIC BLOOD PRESSURE: 140 MMHG | DIASTOLIC BLOOD PRESSURE: 90 MMHG | OXYGEN SATURATION: 100 % | TEMPERATURE: 97.2 F | HEIGHT: 66 IN | HEART RATE: 68 BPM

## 2022-04-26 DIAGNOSIS — S39.012A STRAIN OF LUMBAR REGION, INITIAL ENCOUNTER: Primary | ICD-10-CM

## 2022-04-26 PROCEDURE — 1036F TOBACCO NON-USER: CPT | Performed by: NURSE PRACTITIONER

## 2022-04-26 PROCEDURE — 99213 OFFICE O/P EST LOW 20 MIN: CPT | Performed by: NURSE PRACTITIONER

## 2022-04-26 RX ORDER — BACLOFEN 10 MG/1
10 TABLET ORAL 3 TIMES DAILY
Qty: 30 TABLET | Refills: 1 | Status: SHIPPED | OUTPATIENT
Start: 2022-04-26

## 2022-04-26 NOTE — PROGRESS NOTES
Answers for HPI/ROS submitted by the patient on 4/26/2022  Chronicity: new  Onset: yesterday  Frequency: constantly  Progression since onset: waxing and waning  Pain location: sacro-iliac  Pain quality: aching  Radiates to: does not radiate  Pain - numeric: 7/10  Pain is: the same all the time  Aggravated by: standing  Stiffness is present: all day  abdominal pain: No  bladder incontinence: No  bowel incontinence: No  chest pain: No  dysuria: No  fever: No  headaches: No  leg pain: No  numbness: No  paresis: No  paresthesias: No  pelvic pain: No  perianal numbness: No  tingling: No  weakness: No  weight loss: No  Risk factors: obesity    Assessment/Plan:    1  Strain of lumbar region, initial encounter  -     baclofen 10 mg tablet; Take 1 tablet (10 mg total) by mouth 3 (three) times a day  The case discussed with patient using patient centered shared decision making  The patient was counseled regarding instructions for management,-- risk factor reductions,-- prognosis,-- impressions,-- risks and benefits of treatment options,-- importance of compliance with treatment  I have reviewed the instructions with the patient, answering all questions to her satisfaction  Relative rest  Heat NSAIDs, tylenol  Gentle stretching  F/u if not better in one week        There are no Patient Instructions on file for this visit  Return if symptoms worsen or fail to improve  Subjective:      Patient ID: Otilia Abad is a 47 y o  female  Chief Complaint   Patient presents with    Back Pain     pt c/o lower back pain       Back Pain  This is a new (strained while brushing dog) problem  The current episode started yesterday  The problem occurs constantly  The problem has been waxing and waning since onset  The pain is present in the sacro-iliac  The quality of the pain is described as aching  The pain does not radiate  The pain is at a severity of 7/10  The pain is the same all the time   The symptoms are aggravated by standing  Stiffness is present all day  Pertinent negatives include no abdominal pain, bladder incontinence, bowel incontinence, chest pain, dysuria, fever, headaches, leg pain, numbness, paresis, paresthesias, pelvic pain, perianal numbness, tingling, weakness or weight loss  Risk factors include obesity  She has tried NSAIDs for the symptoms  The treatment provided no relief  The following portions of the patient's history were reviewed and updated as appropriate: allergies, current medications, past family history, past medical history, past social history, past surgical history and problem list     Review of Systems   Constitutional: Negative for fatigue, fever and weight loss  Cardiovascular: Negative for chest pain  Gastrointestinal: Negative for abdominal pain and bowel incontinence  Genitourinary: Negative for bladder incontinence, dysuria and pelvic pain  Musculoskeletal: Positive for back pain  Negative for gait problem  Neurological: Negative for dizziness, tingling, weakness, numbness, headaches and paresthesias  Current Outpatient Medications   Medication Sig Dispense Refill    multivitamin (THERAGRAN) TABS Take 1 tablet by mouth daily      Omega 3 1200 MG CAPS Take by mouth      baclofen 10 mg tablet Take 1 tablet (10 mg total) by mouth 3 (three) times a day 30 tablet 1     No current facility-administered medications for this visit  Objective:    /90 (BP Location: Left arm, Patient Position: Sitting, Cuff Size: Large)   Pulse 68   Temp (!) 97 2 °F (36 2 °C)   Resp 18   Ht 5' 6" (1 676 m)   LMP 03/15/2019   SpO2 100%   BMI 34 67 kg/m²        Physical Exam  Vitals and nursing note reviewed  Constitutional:       General: She is not in acute distress  Appearance: Normal appearance  Neurological:      General: No focal deficit present  Mental Status: She is alert  Mental status is at baseline     Psychiatric:         Mood and Affect: Mood normal  Behavior: Behavior normal                 Edwinna Danielle, CRNP

## 2022-09-13 ENCOUNTER — TELEPHONE (OUTPATIENT)
Dept: FAMILY MEDICINE CLINIC | Facility: CLINIC | Age: 52
End: 2022-09-13

## 2022-09-13 NOTE — TELEPHONE ENCOUNTER
Lmom to confirm if patient will staying with [facility] or transferring to [facility] with Ingris Esteban

## 2023-07-03 ENCOUNTER — OFFICE VISIT (OUTPATIENT)
Dept: FAMILY MEDICINE CLINIC | Facility: CLINIC | Age: 53
End: 2023-07-03
Payer: COMMERCIAL

## 2023-07-03 VITALS
TEMPERATURE: 98.7 F | OXYGEN SATURATION: 98 % | BODY MASS INDEX: 35.4 KG/M2 | HEART RATE: 60 BPM | HEIGHT: 69 IN | RESPIRATION RATE: 16 BRPM | WEIGHT: 239 LBS | DIASTOLIC BLOOD PRESSURE: 80 MMHG | SYSTOLIC BLOOD PRESSURE: 130 MMHG

## 2023-07-03 DIAGNOSIS — Z13.29 SCREENING FOR THYROID DISORDER: ICD-10-CM

## 2023-07-03 DIAGNOSIS — D25.9 UTERINE LEIOMYOMA, UNSPECIFIED LOCATION: ICD-10-CM

## 2023-07-03 DIAGNOSIS — R93.5 ABNORMAL ULTRASOUND OF UTERUS: ICD-10-CM

## 2023-07-03 DIAGNOSIS — Z13.220 LIPID SCREENING: ICD-10-CM

## 2023-07-03 DIAGNOSIS — Z12.31 BREAST CANCER SCREENING BY MAMMOGRAM: ICD-10-CM

## 2023-07-03 DIAGNOSIS — Z12.11 COLON CANCER SCREENING: ICD-10-CM

## 2023-07-03 DIAGNOSIS — Z13.1 SCREENING FOR DIABETES MELLITUS: ICD-10-CM

## 2023-07-03 DIAGNOSIS — Z13.0 SCREENING, DEFICIENCY ANEMIA, IRON: ICD-10-CM

## 2023-07-03 DIAGNOSIS — Z00.00 HEALTH CARE MAINTENANCE: Primary | ICD-10-CM

## 2023-07-03 DIAGNOSIS — R92.8 ABNORMAL MAMMOGRAM OF LEFT BREAST: ICD-10-CM

## 2023-07-03 PROCEDURE — 99396 PREV VISIT EST AGE 40-64: CPT | Performed by: NURSE PRACTITIONER

## 2023-07-03 NOTE — PROGRESS NOTES
ADULT ANNUAL PHYSICAL  [facility] - [facility] [place]    NAME: Ezekiel Scott  AGE: 46 y.o. SEX: female  : 1970     DATE: 7/3/2023     Assessment and Plan:     Problem List Items Addressed This Visit        Other    Abnormal mammogram of left breast    Relevant Orders    US breast left limited (diagnostic)    Mammo diagnostic bilateral w cad   Other Visit Diagnoses     Health care maintenance    -  Primary    Relevant Orders    Lipid panel    Comprehensive metabolic panel    TSH, 3rd generation with Free T4 reflex    CBC and differential    Breast cancer screening by mammogram        Relevant Orders    Mammo diagnostic bilateral w cad    Screening for diabetes mellitus        Relevant Orders    Comprehensive metabolic panel    Screening, deficiency anemia, iron        Relevant Orders    CBC and differential    Screening for thyroid disorder        Relevant Orders    TSH, 3rd generation with Free T4 reflex    Lipid screening        Relevant Orders    Lipid panel    Uterine leiomyoma, unspecified location        Relevant Orders    US pelvis complete w transvaginal    Abnormal ultrasound of uterus        Relevant Orders    US pelvis complete w transvaginal    Colon cancer screening        Relevant Orders    Cologuard        Patient clinically stable at this time. Cont with current plan of care. RTO as recommended and PRN    Immunizations and preventive care screenings were discussed with patient today. Appropriate education was printed on patient's after visit summary. Counseling:  BMI Counseling: Body mass index is 35.29 kg/m². Discussed the patient's BMI with her. The BMI is above average. BMI counseling and education was provided to the patient. Nutrition recommendations include reducing portion sizes, decreasing overall calorie intake, 3-5 servings of fruits/vegetables daily, reducing fast food intake, consuming healthier snacks and moderation in carbohydrate intake. Exercise recommendations include exercising 3-5 times per week. Dental Health: discussed importance of regular tooth brushing, flossing, and dental visits. Injury prevention: discussed safety/seat belts, safety helmets, smoke detectors, carbon dioxide detectors, and smoking near bedding or upholstery. · Sexual health: discussed sexually transmitted diseases, partner selection, use of condoms, avoidance of unintended pregnancy, and contraceptive alternatives. Return in about 1 year (around 7/3/2024). Chief Complaint:     Chief Complaint   Patient presents with   • Annual Exam     Yearly physical       History of Present Illness:     Adult Annual Physical   Patient here for a comprehensive physical exam. The patient reports no problems. Diet and Physical Activity  · Diet/Nutrition: well balanced diet, limited junk food and low carb diet. · Exercise: 3-4 times a week on average. Depression Screening  PHQ-2/9 Depression Screening    Little interest or pleasure in doing things: 0 - not at all  Feeling down, depressed, or hopeless: 0 - not at all  PHQ-2 Score: 0  PHQ-2 Interpretation: Negative depression screen       General Health  · Sleep: sleeps well. · Hearing: normal - bilateral.  · Vision: no vision problems. · Dental: regular dental visits. /GYN Health  · Patient is: postmenopausal  · Next pap due 2024     Review of Systems:     Review of Systems   Genitourinary: Negative for vaginal bleeding. Post menopausal    Due for recheck us pelvis r/t fibroids    Also due for mammo   Psychiatric/Behavioral: Negative for dysphoric mood, sleep disturbance and suicidal ideas. The patient is not nervous/anxious. All other systems reviewed and are negative.      Past Medical History:     Past Medical History:   Diagnosis Date   • Abnormal mammogram of left breast    • Anxiety    • Dense breast    • Menstrual irregularity    • LIBORIO (obstructive sleep apnea)    • Snoring    • Vitamin D deficiency       Past Surgical History:     Past Surgical History:   Procedure Laterality Date   • BREAST BIOPSY Left     age 24s benign   •  SECTION      had 2 C sections      Social History:     Social History     Socioeconomic History   • Marital status: /Civil Union     Spouse name: None   • Number of children: None   • Years of education: None   • Highest education level: None   Occupational History   • None   Tobacco Use   • Smoking status: Never   • Smokeless tobacco: Never   Vaping Use   • Vaping Use: Never used   Substance and Sexual Activity   • Alcohol use: Yes     Comment: socially   • Drug use: No   • Sexual activity: Yes     Partners: Male   Other Topics Concern   • None   Social History Narrative    Daily caffeinated coffee consumption     Social Determinants of Health     Financial Resource Strain: Not on file   Food Insecurity: Not on file   Transportation Needs: Not on file   Physical Activity: Not on file   Stress: Not on file   Social Connections: Not on file   Intimate Partner Violence: Not on file   Housing Stability: Not on file      Family History:     Family History   Problem Relation Age of Onset   • Diabetes Father    • Hypertension Father    • Prostate cancer Father    • No Known Problems Mother    • Skin cancer Sister    • No Known Problems Daughter    • Uterine cancer Maternal Grandmother    • No Known Problems Paternal Grandmother    • No Known Problems Sister    • No Known Problems Sister    • No Known Problems Sister       Current Medications:     Current Outpatient Medications   Medication Sig Dispense Refill   • multivitamin (THERAGRAN) TABS Take 1 tablet by mouth daily     • Omega 3 1200 MG CAPS Take by mouth       No current facility-administered medications for this visit.       Allergies:     No Known Allergies   Physical Exam:     /80 (BP Location: Left arm, Patient Position: Sitting, Cuff Size: Large)   Pulse 60   Temp 98.7 °F (37.1 °C) (Temporal) Resp 16   Ht 5' 9" (1.753 m)   Wt 108 kg (239 lb)   LMP 03/15/2019   SpO2 98%   BMI 35.29 kg/m²     Physical Exam  Vitals and nursing note reviewed. Constitutional:       General: She is not in acute distress. Appearance: Normal appearance. She is well-developed. HENT:      Head: Normocephalic and atraumatic. Eyes:      General: Lids are normal. Lids are everted, no foreign bodies appreciated. Conjunctiva/sclera: Conjunctivae normal.      Pupils: Pupils are equal, round, and reactive to light. Neck:      Thyroid: No thyroid mass or thyromegaly. Vascular: No carotid bruit. Cardiovascular:      Rate and Rhythm: Normal rate and regular rhythm. Pulses: Normal pulses. Heart sounds: Normal heart sounds. No murmur heard. Pulmonary:      Effort: Pulmonary effort is normal. No respiratory distress. Breath sounds: Normal breath sounds. Abdominal:      General: Bowel sounds are normal.      Palpations: Abdomen is soft. There is no hepatomegaly or splenomegaly. Tenderness: There is no abdominal tenderness. Genitourinary:     Exam position: Supine. Vagina: Normal.      Cervix: No cervical motion tenderness, discharge or friability. Musculoskeletal:      Right lower leg: No edema. Left lower leg: No edema. Lymphadenopathy:      Cervical: No cervical adenopathy. Lower Body: No right inguinal adenopathy. No left inguinal adenopathy. Skin:     General: Skin is warm and dry. Coloration: Skin is not pale. Neurological:      General: No focal deficit present. Mental Status: She is alert. Mental status is at baseline. Motor: No abnormal muscle tone.    Psychiatric:         Mood and Affect: Mood normal.         Behavior: Behavior normal.         DEMARCUS Medel  [facility] [place]

## 2023-07-12 ENCOUNTER — HOSPITAL ENCOUNTER (OUTPATIENT)
Dept: RADIOLOGY | Facility: HOSPITAL | Age: 53
Discharge: HOME | End: 2023-07-12
Payer: COMMERCIAL

## 2023-07-12 DIAGNOSIS — R93.5 ABNORMAL ULTRASOUND OF UTERUS: ICD-10-CM

## 2023-07-12 DIAGNOSIS — D25.9 UTERINE LEIOMYOMA, UNSPECIFIED LOCATION: ICD-10-CM

## 2023-07-12 PROCEDURE — 76856 US EXAM PELVIC COMPLETE: CPT

## 2023-07-12 PROCEDURE — 76830 TRANSVAGINAL US NON-OB: CPT

## 2023-08-07 ENCOUNTER — TELEPHONE (OUTPATIENT)
Dept: PULMONOLOGY | Facility: MEDICAL CENTER | Age: 53
End: 2023-08-07

## 2023-08-07 NOTE — TELEPHONE ENCOUNTER
LM for patient to call office back to schedule 16m f/u appointment, schedule next available. Appointment reminder mailed.

## 2023-08-08 ENCOUNTER — TELEPHONE (OUTPATIENT)
Dept: FAMILY MEDICINE CLINIC | Facility: CLINIC | Age: 53
End: 2023-08-08

## 2023-08-08 DIAGNOSIS — R93.89 ENDOMETRIAL THICKENING ON ULTRASOUND: Primary | ICD-10-CM

## 2023-08-08 NOTE — TELEPHONE ENCOUNTER
Patient called she got your message through my chart she has a question and would like  To talk with you about her test results   She will be available after two

## 2023-08-10 NOTE — TELEPHONE ENCOUNTER
I had lengthy discussion with Theodora Nevarez is to monitor US pelvis q 6 months   If endometrial strip continues to increase or if she has vaginal bleeding or spotting, will set her up for endometrial biopsy    Answered her questions

## 2023-12-05 LAB — COLOGUARD RESULT REPORTABLE: NEGATIVE

## 2024-01-18 ENCOUNTER — APPOINTMENT (OUTPATIENT)
Dept: LAB | Facility: CLINIC | Age: 54
End: 2024-01-18
Payer: COMMERCIAL

## 2024-01-18 DIAGNOSIS — Z13.1 SCREENING FOR DIABETES MELLITUS: ICD-10-CM

## 2024-01-18 DIAGNOSIS — Z13.29 SCREENING FOR THYROID DISORDER: ICD-10-CM

## 2024-01-18 DIAGNOSIS — Z13.220 LIPID SCREENING: ICD-10-CM

## 2024-01-18 DIAGNOSIS — Z00.00 HEALTH CARE MAINTENANCE: ICD-10-CM

## 2024-01-18 DIAGNOSIS — Z13.0 SCREENING, DEFICIENCY ANEMIA, IRON: ICD-10-CM

## 2024-01-18 LAB
ALBUMIN SERPL BCP-MCNC: 4.1 G/DL (ref 3.5–5)
ALP SERPL-CCNC: 50 U/L (ref 34–104)
ALT SERPL W P-5'-P-CCNC: 22 U/L (ref 7–52)
ANION GAP SERPL CALCULATED.3IONS-SCNC: 7 MMOL/L
AST SERPL W P-5'-P-CCNC: 23 U/L (ref 13–39)
BASOPHILS # BLD AUTO: 0.03 THOUSANDS/ÂΜL (ref 0–0.1)
BASOPHILS NFR BLD AUTO: 1 % (ref 0–1)
BILIRUB SERPL-MCNC: 0.62 MG/DL (ref 0.2–1)
BUN SERPL-MCNC: 12 MG/DL (ref 5–25)
CALCIUM SERPL-MCNC: 9.4 MG/DL (ref 8.4–10.2)
CHLORIDE SERPL-SCNC: 104 MMOL/L (ref 96–108)
CHOLEST SERPL-MCNC: 206 MG/DL
CO2 SERPL-SCNC: 28 MMOL/L (ref 21–32)
CREAT SERPL-MCNC: 0.79 MG/DL (ref 0.6–1.3)
EOSINOPHIL # BLD AUTO: 0.1 THOUSAND/ÂΜL (ref 0–0.61)
EOSINOPHIL NFR BLD AUTO: 2 % (ref 0–6)
ERYTHROCYTE [DISTWIDTH] IN BLOOD BY AUTOMATED COUNT: 13.6 % (ref 11.6–15.1)
GFR SERPL CREATININE-BSD FRML MDRD: 85 ML/MIN/1.73SQ M
GLUCOSE P FAST SERPL-MCNC: 91 MG/DL (ref 65–99)
HCT VFR BLD AUTO: 42.1 % (ref 34.8–46.1)
HDLC SERPL-MCNC: 54 MG/DL
HGB BLD-MCNC: 13.3 G/DL (ref 11.5–15.4)
IMM GRANULOCYTES # BLD AUTO: 0.02 THOUSAND/UL (ref 0–0.2)
IMM GRANULOCYTES NFR BLD AUTO: 0 % (ref 0–2)
LDLC SERPL CALC-MCNC: 128 MG/DL (ref 0–100)
LYMPHOCYTES # BLD AUTO: 2.51 THOUSANDS/ÂΜL (ref 0.6–4.47)
LYMPHOCYTES NFR BLD AUTO: 41 % (ref 14–44)
MCH RBC QN AUTO: 27.4 PG (ref 26.8–34.3)
MCHC RBC AUTO-ENTMCNC: 31.6 G/DL (ref 31.4–37.4)
MCV RBC AUTO: 87 FL (ref 82–98)
MONOCYTES # BLD AUTO: 0.44 THOUSAND/ÂΜL (ref 0.17–1.22)
MONOCYTES NFR BLD AUTO: 7 % (ref 4–12)
NEUTROPHILS # BLD AUTO: 3.05 THOUSANDS/ÂΜL (ref 1.85–7.62)
NEUTS SEG NFR BLD AUTO: 49 % (ref 43–75)
NONHDLC SERPL-MCNC: 152 MG/DL
NRBC BLD AUTO-RTO: 0 /100 WBCS
PLATELET # BLD AUTO: 290 THOUSANDS/UL (ref 149–390)
PMV BLD AUTO: 11.3 FL (ref 8.9–12.7)
POTASSIUM SERPL-SCNC: 4.5 MMOL/L (ref 3.5–5.3)
PROT SERPL-MCNC: 6.5 G/DL (ref 6.4–8.4)
RBC # BLD AUTO: 4.85 MILLION/UL (ref 3.81–5.12)
SODIUM SERPL-SCNC: 139 MMOL/L (ref 135–147)
TRIGL SERPL-MCNC: 119 MG/DL
TSH SERPL DL<=0.05 MIU/L-ACNC: 1.6 UIU/ML (ref 0.45–4.5)
WBC # BLD AUTO: 6.15 THOUSAND/UL (ref 4.31–10.16)

## 2024-01-18 PROCEDURE — 84443 ASSAY THYROID STIM HORMONE: CPT

## 2024-01-18 PROCEDURE — 36415 COLL VENOUS BLD VENIPUNCTURE: CPT

## 2024-01-18 PROCEDURE — 80061 LIPID PANEL: CPT

## 2024-01-18 PROCEDURE — 80053 COMPREHEN METABOLIC PANEL: CPT

## 2024-01-18 PROCEDURE — 85025 COMPLETE CBC W/AUTO DIFF WBC: CPT

## 2024-02-21 ENCOUNTER — OFFICE VISIT (OUTPATIENT)
Dept: FAMILY MEDICINE CLINIC | Facility: CLINIC | Age: 54
End: 2024-02-21
Payer: COMMERCIAL

## 2024-02-21 VITALS
TEMPERATURE: 97.3 F | HEART RATE: 90 BPM | DIASTOLIC BLOOD PRESSURE: 78 MMHG | SYSTOLIC BLOOD PRESSURE: 136 MMHG | OXYGEN SATURATION: 98 % | WEIGHT: 252 LBS | RESPIRATION RATE: 17 BRPM | HEIGHT: 69 IN | BODY MASS INDEX: 37.33 KG/M2

## 2024-02-21 DIAGNOSIS — E66.01 MORBID OBESITY DUE TO EXCESS CALORIES (HCC): ICD-10-CM

## 2024-02-21 DIAGNOSIS — Z91.89 AT INCREASED RISK FOR CARDIOVASCULAR DISEASE: ICD-10-CM

## 2024-02-21 DIAGNOSIS — R73.03 PRE-DIABETES: ICD-10-CM

## 2024-02-21 DIAGNOSIS — Z78.9 ADVISED ABOUT MANAGEMENT OF WEIGHT: ICD-10-CM

## 2024-02-21 DIAGNOSIS — E78.5 DYSLIPIDEMIA: ICD-10-CM

## 2024-02-21 LAB — SL AMB POCT HEMOGLOBIN AIC: 5.2 (ref ?–6.5)

## 2024-02-21 PROCEDURE — 99214 OFFICE O/P EST MOD 30 MIN: CPT | Performed by: NURSE PRACTITIONER

## 2024-02-21 NOTE — PROGRESS NOTES
Assessment/Plan:    1. BMI 37.0-37.9, adult    2. Morbid obesity due to excess calories (HCC)    3. Dyslipidemia    4. At increased risk for cardiovascular disease    5. Pre-diabetes        The case discussed with patient using patient centered shared decision making.The patient was counseled regarding instructions for management,-- risk factor reductions,-- prognosis,-- impressions,-- risks and benefits of treatment options,-- importance of compliance with treatment. I have reviewed the instructions with the patient, answering all questions to her satisfaction.    Had lengthy discussion with  Jennyfer concerning escalation of options for weight management as healthy diet and exercise have been ineffective for few years.  Patient is at moderate risk for cardiac events in view of her BMI of 37, and her current status of dyslipidemia, prediabetes, borderline hypertension, family history of heart disease in father.  She is a prime candidate for GLP-1 medications and opts for Mounjaro given its favorable profile and reducing cardiac risk factors.  She opts to proceed with the same.  Will start with low-dose and titrate slowly.  She will continue with healthy diet, exercise of moderate cardiovascular 150 minutes/week and 2 sessions of weight training to start.  I will see her in office in 4 months for weight check.  Did ask that she messages me with update in 2 months              No follow-ups on file.    I have spent a total time of 30 minutes on 02/21/24 in caring for this patient including Diagnostic results, Prognosis, Risks and benefits of tx options, Instructions for management, Patient and family education, Importance of tx compliance, Risk factor reductions, Impressions, Counseling / Coordination of care, Documenting in the medical record, Reviewing / ordering tests, medicine, procedures  , and Obtaining or reviewing history  .    Subjective:      Patient ID: Jennyfer Salcedo is a 53 y.o. female.    Chief  Complaint   Patient presents with    Multiple Concerns       53-year-old female with history of dyslipidemia, morbid obesity increasing with BMI of 37, prediabetes, borderline hypertension presents to discuss weight management options  She has been struggling with her weight through the years  She is eating healthy and walks several times per week for exercise.  She has been unsuccessful in weight loss    She has family history of CAD in her father    With her health profile she is back risk of cardiac events    She would like to discuss ways to reduce her health risks and jumpstart her weight loss with the help of medications since traditional approach of exercise and healthy eating are not helping.  She does not have thyroid problem.  She denies history of gastroparesis thyroid cancer.       Reviewed medical history in detail. Changes to medical record changed where appropriate. Reviewed medications. Patient taking as prescribed. Tolerating well. Denies Side effects. Reviewed recent visits with specialists co-managing chronic conditions.     The following portions of the patient's history were reviewed and updated as appropriate: allergies, current medications, past family history, past medical history, past social history, past surgical history and problem list.    Review of Systems   Constitutional: Negative.    Respiratory: Negative.     Cardiovascular: Negative.    Gastrointestinal: Negative.    Neurological: Negative.          Current Outpatient Medications   Medication Sig Dispense Refill    multivitamin (THERAGRAN) TABS Take 1 tablet by mouth daily      Omega 3 1200 MG CAPS Take by mouth       No current facility-administered medications for this visit.       Patient Active Problem List   Diagnosis    Abnormal mammogram of left breast    Menstrual irregularity    Vitamin D deficiency    Anxiety    Snoring    Dense breasts    LIBORIO (obstructive sleep apnea)    Obesity (BMI 35.0-39.9 without comorbidity)  "      Objective:    /78 (BP Location: Left arm, Patient Position: Sitting, Cuff Size: Large)   Pulse 90   Temp (!) 97.3 °F (36.3 °C) (Temporal)   Resp 17   Ht 5' 9\" (1.753 m)   Wt 114 kg (252 lb)   LMP 03/15/2019   SpO2 98%   BMI 37.21 kg/m²        Physical Exam  Vitals and nursing note reviewed.   Constitutional:       General: She is not in acute distress.     Appearance: Normal appearance. She is obese.   HENT:      Head: Normocephalic and atraumatic.   Cardiovascular:      Rate and Rhythm: Normal rate and regular rhythm.      Pulses: Normal pulses.      Heart sounds: Normal heart sounds.   Pulmonary:      Effort: Pulmonary effort is normal.      Breath sounds: Normal breath sounds.   Abdominal:      General: Bowel sounds are normal.      Palpations: Abdomen is soft.      Tenderness: There is no abdominal tenderness.   Skin:     General: Skin is warm and dry.   Neurological:      General: No focal deficit present.      Mental Status: She is alert. Mental status is at baseline.   Psychiatric:         Behavior: Behavior normal.                DEMARCUS Medel   "

## 2024-02-22 ENCOUNTER — TELEPHONE (OUTPATIENT)
Age: 54
End: 2024-02-22

## 2024-02-22 NOTE — TELEPHONE ENCOUNTER
Mounjaro will not be covered with or without a prior authorization, Mounjaro is not FDA approved for obesity, prediabetes, etc. Mounjaro is only FDA Approved for Type 2 Diabetes and will only be Approved via a Prior Authorization if patient has a diagnosis of Type 2 Diabetes which per this pts chart she does not have.

## 2024-03-14 ENCOUNTER — TELEPHONE (OUTPATIENT)
Dept: RADIOLOGY | Facility: HOSPITAL | Age: 54
End: 2024-03-14

## 2024-04-10 ENCOUNTER — APPOINTMENT (OUTPATIENT)
Dept: LAB | Facility: CLINIC | Age: 54
End: 2024-04-10
Payer: COMMERCIAL

## 2024-04-10 DIAGNOSIS — E53.8 VITAMIN B 12 DEFICIENCY: ICD-10-CM

## 2024-04-10 DIAGNOSIS — Z13.220 SCREENING FOR LIPOID DISORDERS: ICD-10-CM

## 2024-04-10 DIAGNOSIS — Z13.1 SCREENING FOR DIABETES MELLITUS: ICD-10-CM

## 2024-04-10 DIAGNOSIS — R63.5 ABNORMAL WEIGHT GAIN: ICD-10-CM

## 2024-04-10 DIAGNOSIS — Z00.00 ROUTINE GENERAL MEDICAL EXAMINATION AT A HEALTH CARE FACILITY: ICD-10-CM

## 2024-04-10 DIAGNOSIS — E88.810 METABOLIC SYNDROME: ICD-10-CM

## 2024-04-10 DIAGNOSIS — R53.83 FATIGUE, UNSPECIFIED TYPE: ICD-10-CM

## 2024-04-10 DIAGNOSIS — Z79.899 ENCOUNTER FOR LONG-TERM (CURRENT) USE OF OTHER MEDICATIONS: ICD-10-CM

## 2024-04-10 DIAGNOSIS — N95.1 MENOPAUSAL SYMPTOMS: ICD-10-CM

## 2024-04-10 DIAGNOSIS — E07.9 DISEASE OF THYROID GLAND: ICD-10-CM

## 2024-04-10 DIAGNOSIS — E55.9 AVITAMINOSIS D: ICD-10-CM

## 2024-04-10 LAB
CRP SERPL HS-MCNC: 2.1 MG/L
FSH SERPL-ACNC: 70.4 MIU/ML
HCYS SERPL-SCNC: 9.3 UMOL/L (ref 5–15)
PROGEST SERPL-MCNC: 0.21 NG/ML
T3FREE SERPL-MCNC: 2.61 PG/ML (ref 2.5–3.9)
T4 FREE SERPL-MCNC: 0.78 NG/DL (ref 0.61–1.12)
TSH SERPL DL<=0.05 MIU/L-ACNC: 1.25 UIU/ML (ref 0.45–4.5)
VIT B12 SERPL-MCNC: 337 PG/ML (ref 180–914)

## 2024-04-10 PROCEDURE — 82670 ASSAY OF TOTAL ESTRADIOL: CPT

## 2024-04-10 PROCEDURE — 83695 ASSAY OF LIPOPROTEIN(A): CPT

## 2024-04-10 PROCEDURE — 84410 TESTOSTERONE BIOAVAILABLE: CPT

## 2024-04-10 PROCEDURE — 84270 ASSAY OF SEX HORMONE GLOBUL: CPT

## 2024-04-10 PROCEDURE — 84144 ASSAY OF PROGESTERONE: CPT

## 2024-04-10 PROCEDURE — 83001 ASSAY OF GONADOTROPIN (FSH): CPT

## 2024-04-10 PROCEDURE — 83520 IMMUNOASSAY QUANT NOS NONAB: CPT

## 2024-04-10 PROCEDURE — 86141 C-REACTIVE PROTEIN HS: CPT

## 2024-04-10 PROCEDURE — 82172 ASSAY OF APOLIPOPROTEIN: CPT

## 2024-04-10 PROCEDURE — 84402 ASSAY OF FREE TESTOSTERONE: CPT

## 2024-04-10 PROCEDURE — 82627 DEHYDROEPIANDROSTERONE: CPT

## 2024-04-10 PROCEDURE — 84481 FREE ASSAY (FT-3): CPT

## 2024-04-10 PROCEDURE — 83698 ASSAY LIPOPROTEIN PLA2: CPT

## 2024-04-10 PROCEDURE — 83090 ASSAY OF HOMOCYSTEINE: CPT

## 2024-04-10 PROCEDURE — 84439 ASSAY OF FREE THYROXINE: CPT

## 2024-04-10 PROCEDURE — 83735 ASSAY OF MAGNESIUM: CPT

## 2024-04-10 PROCEDURE — 84443 ASSAY THYROID STIM HORMONE: CPT

## 2024-04-10 PROCEDURE — 83525 ASSAY OF INSULIN: CPT

## 2024-04-10 PROCEDURE — 84681 ASSAY OF C-PEPTIDE: CPT | Performed by: STUDENT IN AN ORGANIZED HEALTH CARE EDUCATION/TRAINING PROGRAM

## 2024-04-10 PROCEDURE — 82607 VITAMIN B-12: CPT

## 2024-04-10 PROCEDURE — 84403 ASSAY OF TOTAL TESTOSTERONE: CPT

## 2024-04-10 PROCEDURE — 36415 COLL VENOUS BLD VENIPUNCTURE: CPT

## 2024-04-11 LAB
APO B SERPL-MCNC: 106 MG/DL
DHEA-S SERPL-MCNC: 46.9 UG/DL (ref 41.2–243.7)
ESTRADIOL SERPL-MCNC: 15.9 PG/ML
LPA SERPL-SCNC: 25 NMOL/L
TESTOST FREE SERPL-MCNC: 1 PG/ML (ref 0–4.2)
TESTOST SERPL-MCNC: 14 NG/DL (ref 4–50)
ZPP RBC-MCNC: 19 UG/DL (ref 0–36)

## 2024-04-12 LAB
MISCELLANEOUS LAB TEST RESULT: NORMAL
MYELOPEROXIDASE AB SER IA-ACNC: <0.2 UNITS (ref 0–0.9)

## 2024-04-14 LAB — MAGNESIUM RBC-MCNC: 4.7 MG/DL (ref 3.7–7)

## 2024-04-18 LAB
SHBG SERPL-SCNC: 70.8 NMOL/L
TESTOST SERPL-MCNC: 27 NG/DL
TESTOSTERONE.FREE+WB MFR SERPL: 13.5 %
TESTOSTERONE.FREE+WB SERPL-MCNC: 3.6 NG/DL

## 2024-04-19 LAB — MISCELLANEOUS LAB TEST RESULT: NORMAL

## 2024-07-02 ENCOUNTER — APPOINTMENT (OUTPATIENT)
Dept: LAB | Facility: CLINIC | Age: 54
End: 2024-07-02
Payer: COMMERCIAL

## 2024-07-02 DIAGNOSIS — N95.1 MENOPAUSAL SYMPTOMS: ICD-10-CM

## 2024-07-02 DIAGNOSIS — Z13.1 SCREENING FOR DIABETES MELLITUS: ICD-10-CM

## 2024-07-02 DIAGNOSIS — E07.9 DISEASE OF THYROID GLAND: ICD-10-CM

## 2024-07-02 DIAGNOSIS — R63.5 ABNORMAL WEIGHT GAIN: ICD-10-CM

## 2024-07-02 DIAGNOSIS — Z00.00 ROUTINE GENERAL MEDICAL EXAMINATION AT A HEALTH CARE FACILITY: ICD-10-CM

## 2024-07-02 DIAGNOSIS — Z79.899 ENCOUNTER FOR LONG-TERM (CURRENT) USE OF OTHER MEDICATIONS: ICD-10-CM

## 2024-07-02 DIAGNOSIS — E55.9 AVITAMINOSIS D: ICD-10-CM

## 2024-07-02 DIAGNOSIS — E53.8 VITAMIN B 12 DEFICIENCY: ICD-10-CM

## 2024-07-02 DIAGNOSIS — R53.83 FATIGUE, UNSPECIFIED TYPE: ICD-10-CM

## 2024-07-02 DIAGNOSIS — Z13.220 SCREENING FOR LIPOID DISORDERS: ICD-10-CM

## 2024-07-02 DIAGNOSIS — Z79.890 HORMONE REPLACEMENT THERAPY: ICD-10-CM

## 2024-07-02 DIAGNOSIS — E88.810 METABOLIC SYNDROME: ICD-10-CM

## 2024-07-02 PROCEDURE — 83525 ASSAY OF INSULIN: CPT

## 2024-07-02 PROCEDURE — 36415 COLL VENOUS BLD VENIPUNCTURE: CPT

## 2024-07-02 PROCEDURE — 84681 ASSAY OF C-PEPTIDE: CPT | Performed by: STUDENT IN AN ORGANIZED HEALTH CARE EDUCATION/TRAINING PROGRAM

## 2024-07-02 PROCEDURE — 84403 ASSAY OF TOTAL TESTOSTERONE: CPT

## 2024-07-02 PROCEDURE — 84402 ASSAY OF FREE TESTOSTERONE: CPT

## 2024-07-03 LAB
TESTOST FREE SERPL-MCNC: 0.6 PG/ML (ref 0–4.2)
TESTOST SERPL-MCNC: 17 NG/DL (ref 4–50)

## 2024-07-16 LAB — MISCELLANEOUS LAB TEST RESULT: NORMAL

## 2024-09-23 ENCOUNTER — APPOINTMENT (OUTPATIENT)
Dept: LAB | Facility: CLINIC | Age: 54
End: 2024-09-23
Payer: COMMERCIAL

## 2024-09-23 DIAGNOSIS — E88.810 METABOLIC SYNDROME: ICD-10-CM

## 2024-09-23 DIAGNOSIS — Z00.00 ENCOUNTER FOR GENERAL ADULT MEDICAL EXAMINATION WITHOUT ABNORMAL FINDINGS: ICD-10-CM

## 2024-09-23 DIAGNOSIS — Z13.220 ENCOUNTER FOR SCREENING FOR LIPOID DISORDERS: ICD-10-CM

## 2024-09-23 DIAGNOSIS — R53.83 OTHER FATIGUE: ICD-10-CM

## 2024-09-23 DIAGNOSIS — Z13.1 ENCOUNTER FOR SCREENING FOR DIABETES MELLITUS: ICD-10-CM

## 2024-09-23 DIAGNOSIS — Z79.890 HORMONE REPLACEMENT THERAPY: ICD-10-CM

## 2024-09-23 DIAGNOSIS — Z79.899 OTHER LONG TERM (CURRENT) DRUG THERAPY: ICD-10-CM

## 2024-09-23 LAB
ALBUMIN SERPL BCG-MCNC: 4.1 G/DL (ref 3.5–5)
ALP SERPL-CCNC: 46 U/L (ref 34–104)
ALT SERPL W P-5'-P-CCNC: 12 U/L (ref 7–52)
ANION GAP SERPL CALCULATED.3IONS-SCNC: 9 MMOL/L (ref 4–13)
AST SERPL W P-5'-P-CCNC: 17 U/L (ref 13–39)
BILIRUB SERPL-MCNC: 0.51 MG/DL (ref 0.2–1)
BUN SERPL-MCNC: 8 MG/DL (ref 5–25)
CALCIUM SERPL-MCNC: 9.2 MG/DL (ref 8.4–10.2)
CHLORIDE SERPL-SCNC: 105 MMOL/L (ref 96–108)
CHOLEST SERPL-MCNC: 203 MG/DL
CO2 SERPL-SCNC: 25 MMOL/L (ref 21–32)
CREAT SERPL-MCNC: 0.75 MG/DL (ref 0.6–1.3)
EST. AVERAGE GLUCOSE BLD GHB EST-MCNC: 94 MG/DL
GFR SERPL CREATININE-BSD FRML MDRD: 90 ML/MIN/1.73SQ M
GLUCOSE P FAST SERPL-MCNC: 80 MG/DL (ref 65–99)
HBA1C MFR BLD: 4.9 %
HDLC SERPL-MCNC: 38 MG/DL
LDLC SERPL CALC-MCNC: 132 MG/DL (ref 0–100)
NONHDLC SERPL-MCNC: 165 MG/DL
POTASSIUM SERPL-SCNC: 4 MMOL/L (ref 3.5–5.3)
PROT SERPL-MCNC: 6.9 G/DL (ref 6.4–8.4)
SODIUM SERPL-SCNC: 139 MMOL/L (ref 135–147)
TRIGL SERPL-MCNC: 166 MG/DL

## 2024-09-23 PROCEDURE — 80061 LIPID PANEL: CPT

## 2024-09-23 PROCEDURE — 84403 ASSAY OF TOTAL TESTOSTERONE: CPT

## 2024-09-23 PROCEDURE — 84410 TESTOSTERONE BIOAVAILABLE: CPT

## 2024-09-23 PROCEDURE — 83036 HEMOGLOBIN GLYCOSYLATED A1C: CPT

## 2024-09-23 PROCEDURE — 84402 ASSAY OF FREE TESTOSTERONE: CPT

## 2024-09-23 PROCEDURE — 80053 COMPREHEN METABOLIC PANEL: CPT

## 2024-09-23 PROCEDURE — 36415 COLL VENOUS BLD VENIPUNCTURE: CPT

## 2024-09-23 PROCEDURE — 84681 ASSAY OF C-PEPTIDE: CPT | Performed by: STUDENT IN AN ORGANIZED HEALTH CARE EDUCATION/TRAINING PROGRAM

## 2024-09-23 PROCEDURE — 84270 ASSAY OF SEX HORMONE GLOBUL: CPT

## 2024-09-25 LAB
TESTOST FREE SERPL-MCNC: 0.8 PG/ML (ref 0–4.2)
TESTOST SERPL-MCNC: 13 NG/DL (ref 4–50)

## 2024-10-04 ENCOUNTER — APPOINTMENT (OUTPATIENT)
Dept: LAB | Facility: CLINIC | Age: 54
End: 2024-10-04
Payer: COMMERCIAL

## 2024-10-04 DIAGNOSIS — Z13.1 ENCOUNTER FOR SCREENING FOR DIABETES MELLITUS: ICD-10-CM

## 2024-10-04 DIAGNOSIS — Z00.00 ENCOUNTER FOR GENERAL ADULT MEDICAL EXAMINATION WITHOUT ABNORMAL FINDINGS: ICD-10-CM

## 2024-10-04 DIAGNOSIS — Z79.890 HORMONE REPLACEMENT THERAPY: ICD-10-CM

## 2024-10-04 DIAGNOSIS — E88.810 METABOLIC SYNDROME: ICD-10-CM

## 2024-10-04 DIAGNOSIS — Z79.899 OTHER LONG TERM (CURRENT) DRUG THERAPY: ICD-10-CM

## 2024-10-04 DIAGNOSIS — Z13.220 ENCOUNTER FOR SCREENING FOR LIPOID DISORDERS: ICD-10-CM

## 2024-10-04 DIAGNOSIS — R53.83 OTHER FATIGUE: ICD-10-CM

## 2024-10-04 PROCEDURE — 83525 ASSAY OF INSULIN: CPT

## 2024-10-04 PROCEDURE — 36415 COLL VENOUS BLD VENIPUNCTURE: CPT

## 2024-10-04 PROCEDURE — 84681 ASSAY OF C-PEPTIDE: CPT | Performed by: STUDENT IN AN ORGANIZED HEALTH CARE EDUCATION/TRAINING PROGRAM

## 2024-10-10 LAB — MISCELLANEOUS LAB TEST RESULT: NORMAL

## 2024-10-12 LAB
SHBG SERPL-SCNC: 83.2 NMOL/L
TESTOST SERPL-MCNC: 27 NG/DL
TESTOSTERONE.FREE+WB MFR SERPL: 8.1 %
TESTOSTERONE.FREE+WB SERPL-MCNC: 2.2 NG/DL

## 2024-11-05 ENCOUNTER — OFFICE VISIT (OUTPATIENT)
Dept: FAMILY MEDICINE CLINIC | Facility: CLINIC | Age: 54
End: 2024-11-05
Payer: COMMERCIAL

## 2024-11-05 VITALS
WEIGHT: 228 LBS | DIASTOLIC BLOOD PRESSURE: 86 MMHG | SYSTOLIC BLOOD PRESSURE: 126 MMHG | HEART RATE: 84 BPM | TEMPERATURE: 97.9 F | HEIGHT: 69 IN | RESPIRATION RATE: 17 BRPM | OXYGEN SATURATION: 97 % | BODY MASS INDEX: 33.77 KG/M2

## 2024-11-05 DIAGNOSIS — Z00.00 ANNUAL PHYSICAL EXAM: Primary | ICD-10-CM

## 2024-11-05 DIAGNOSIS — E66.811 CLASS 1 OBESITY DUE TO EXCESS CALORIES WITHOUT SERIOUS COMORBIDITY WITH BODY MASS INDEX (BMI) OF 33.0 TO 33.9 IN ADULT: ICD-10-CM

## 2024-11-05 DIAGNOSIS — E66.09 CLASS 1 OBESITY DUE TO EXCESS CALORIES WITHOUT SERIOUS COMORBIDITY WITH BODY MASS INDEX (BMI) OF 33.0 TO 33.9 IN ADULT: ICD-10-CM

## 2024-11-05 DIAGNOSIS — Z12.31 BREAST CANCER SCREENING BY MAMMOGRAM: ICD-10-CM

## 2024-11-05 PROCEDURE — 99396 PREV VISIT EST AGE 40-64: CPT | Performed by: NURSE PRACTITIONER

## 2024-11-05 RX ORDER — TESTOSTERONE 25 MG/2.5G
25 GEL TRANSDERMAL DAILY
COMMUNITY

## 2024-11-05 NOTE — PATIENT INSTRUCTIONS
"Patient Education     Routine physical for adults   The Basics   Written by the doctors and editors at [facility]   What is a physical? -- A physical is a routine visit, or \"check-up,\" with your doctor. You might also hear it called a \"wellness visit\" or \"preventive visit.\"  During each visit, the doctor will:   Ask about your physical and mental health   Ask about your habits, behaviors, and lifestyle   Do an exam   Give you vaccines if needed   Talk to you about any medicines you take   Give advice about your health   Answer your questions  Getting regular check-ups is an important part of taking care of your health. It can help your doctor find and treat any problems you have. But it's also important for preventing health problems.  A routine physical is different from a \"sick visit.\" A sick visit is when you see a doctor because of a health concern or problem. Since physicals are scheduled ahead of time, you can think about what you want to ask the doctor.  How often should I get a physical? -- It depends on your age and health. In general, for people age 21 years and older:   If you are younger than 50 years, you might be able to get a physical every 3 years.   If you are 50 years or older, your doctor might recommend a physical every year.  If you have an ongoing health condition, like diabetes or high blood pressure, your doctor will probably want to see you more often.  What happens during a physical? -- In general, each visit will include:   Physical exam - The doctor or nurse will check your height, weight, heart rate, and blood pressure. They will also look at your eyes and ears. They will ask about how you are feeling and whether you have any symptoms that bother you.   Medicines - It's a good idea to bring a list of all the medicines you take to each doctor visit. Your doctor will talk to you about your medicines and answer any questions. Tell them if you are having any side effects that bother you. You " "should also tell them if you are having trouble paying for any of your medicines.   Habits and behaviors - This includes:   Your diet   Your exercise habits   Whether you smoke, drink alcohol, or use drugs   Whether you are sexually active   Whether you feel safe at home  Your doctor will talk to you about things you can do to improve your health and lower your risk of health problems. They will also offer help and support. For example, if you want to quit smoking, they can give you advice and might prescribe medicines. If you want to improve your diet or get more physical activity, they can help you with this, too.   Lab tests, if needed - The tests you get will depend on your age and situation. For example, your doctor might want to check your:   Cholesterol   Blood sugar   Iron level   Vaccines - The recommended vaccines will depend on your age, health, and what vaccines you already had. Vaccines are very important because they can prevent certain serious or deadly infections.   Discussion of screening - \"Screening\" means checking for diseases or other health problems before they cause symptoms. Your doctor can recommend screening based on your age, risk, and preferences. This might include tests to check for:   Cancer, such as breast, prostate, cervical, ovarian, colorectal, prostate, lung, or skin cancer   Sexually transmitted infections, such as chlamydia and gonorrhea   Mental health conditions like depression and anxiety  Your doctor will talk to you about the different types of screening tests. They can help you decide which screenings to have. They can also explain what the results might mean.   Answering questions - The physical is a good time to ask the doctor or nurse questions about your health. If needed, they can refer you to other doctors or specialists, too.  Adults older than 65 years often need other care, too. As you get older, your doctor will talk to you about:   How to prevent falling at " home   Hearing or vision tests   Memory testing   How to take your medicines safely   Making sure that you have the help and support you need at home  All topics are updated as new evidence becomes available and our peer review process is complete.  This topic retrieved from [employer] on: May 02, 2024.  Topic 695322 Version 1.0  Release: 32.4.3 - C32.122  © 2024 UpToDate, Inc. and/or its affiliates. All rights reserved.  Consumer Information Use and Disclaimer   Disclaimer: This generalized information is a limited summary of diagnosis, treatment, and/or medication information. It is not meant to be comprehensive and should be used as a tool to help the user understand and/or assess potential diagnostic and treatment options. It does NOT include all information about conditions, treatments, medications, side effects, or risks that may apply to a specific patient. It is not intended to be medical advice or a substitute for the medical advice, diagnosis, or treatment of a health care provider based on the health care provider's examination and assessment of a patient's specific and unique circumstances. Patients must speak with a health care provider for complete information about their health, medical questions, and treatment options, including any risks or benefits regarding use of medications. This information does not endorse any treatments or medications as safe, effective, or approved for treating a specific patient. UpToDate, Inc. and its affiliates disclaim any warranty or liability relating to this information or the use thereof.The use of this information is governed by the Terms of Use, available at https://www.woltersKahuauwer.com/en/know/clinical-effectiveness-terms. 2024© UpToDate, Inc. and its affiliates and/or licensors. All rights reserved.  Copyright   © 2024 Up[employer]Date, Inc. and/or its affiliates. All rights reserved.

## 2024-11-05 NOTE — PROGRESS NOTES
Adult Annual Physical  Name: Jennyfer Salcedo      : 1970      MRN: [id number]  Encounter Provider: DEMARCUS Medel  Encounter Date: 2024   Encounter department: [facility] [place]    Assessment & Plan  Breast cancer screening by mammogram    Orders:    Mammo screening bilateral w 3d and cad; Future    Annual physical exam  Patient clinically stable at this time.  Cont with current plan of care.   RTO as recommended and PRN       Class 1 obesity due to excess calories without serious comorbidity with body mass index (BMI) of 33.0 to 33.9 in adult    Working with wellness clinic  Receiving GLP-1 through compound pharmacy-has lost 30 pounds successfully       Immunizations and preventive care screenings were discussed with patient today. Appropriate education was printed on patient's after visit summary.    Counseling:  Dental Health: discussed importance of regular tooth brushing, flossing, and dental visits.  Injury prevention: discussed safety/seat belts, safety helmets, smoke detectors, carbon monoxide detectors, and smoking near bedding or upholstery.  Exercise: the importance of regular exercise/physical activity was discussed. Recommend exercise 3-5 times per week for at least 30 minutes.          History of Present Illness     Adult Annual Physical:  Patient presents for annual physical.     Diet and Physical Activity:  - Diet/Nutrition: portion control. GLP-1  - Exercise: walking.    Depression Screening:  - PHQ-2 Score: 0    General Health:  - Sleep: sleeps well.  - Hearing: normal hearing bilateral ears.  - Vision: no vision problems and goes for regular eye exams.  - Dental: regular dental visits.    /GYN Health:  - Follows with GYN: yes.   - Menopause: postmenopausal.   - History of STDs: no    Review of Systems   Constitutional: Negative.    Respiratory: Negative.     Cardiovascular: Negative.    Gastrointestinal: Negative.    Neurological: Negative.      Medical History  "Reviewed by provider this encounter:  Tobacco  Allergies  Meds  Problems  Med Hx  Surg Hx  Fam Hx       Current Outpatient Medications on File Prior to Visit   Medication Sig Dispense Refill    multivitamin (THERAGRAN) TABS Take 1 tablet by mouth daily      Omega 3 1200 MG CAPS Take by mouth       No current facility-administered medications on file prior to visit.        Objective     /86 (BP Location: Right arm, Patient Position: Sitting, Cuff Size: Standard)   Pulse 84   Temp 97.9 °F (36.6 °C) (Temporal)   Resp 17   Ht 5' 9\" (1.753 m)   Wt 103 kg (228 lb)   LMP 03/15/2019   SpO2 97%   BMI 33.67 kg/m²     Physical Exam  Vitals and nursing note reviewed.   Constitutional:       General: She is not in acute distress.     Appearance: Normal appearance. She is well-developed.   HENT:      Head: Normocephalic and atraumatic.   Eyes:      General: Lids are normal. Lids are everted, no foreign bodies appreciated.      Conjunctiva/sclera: Conjunctivae normal.      Pupils: Pupils are equal, round, and reactive to light.   Neck:      Thyroid: No thyroid mass or thyromegaly.      Vascular: No JVD.   Cardiovascular:      Rate and Rhythm: Normal rate and regular rhythm.      Pulses: Normal pulses.      Heart sounds: Normal heart sounds. No murmur heard.  Pulmonary:      Effort: Pulmonary effort is normal. No respiratory distress.      Breath sounds: Normal breath sounds.   Abdominal:      General: Bowel sounds are normal.      Palpations: Abdomen is soft. There is no hepatomegaly or splenomegaly.      Tenderness: There is no abdominal tenderness.   Musculoskeletal:         General: Normal range of motion.      Cervical back: Normal range of motion and neck supple.      Right lower leg: No edema.      Left lower leg: No edema.   Lymphadenopathy:      Cervical: No cervical adenopathy.   Skin:     General: Skin is warm and dry.      Coloration: Skin is not pale.   Neurological:      General: No focal deficit " present.      Mental Status: She is alert. Mental status is at baseline.   Psychiatric:         Mood and Affect: Mood normal.         Behavior: Behavior normal.

## 2024-11-05 NOTE — ASSESSMENT & PLAN NOTE
Working with wellness clinic  Receiving GLP-1 through compound [facility]-has lost 30 pounds successfully

## 2025-02-28 ENCOUNTER — APPOINTMENT (OUTPATIENT)
Dept: LAB | Facility: CLINIC | Age: 55
End: 2025-02-28
Payer: COMMERCIAL

## 2025-02-28 DIAGNOSIS — Z79.890 HORMONE REPLACEMENT THERAPY: ICD-10-CM

## 2025-02-28 DIAGNOSIS — Z00.00 LABORATORY EXAM ORDERED AS PART OF ROUTINE GENERAL MEDICAL EXAMINATION: ICD-10-CM

## 2025-02-28 DIAGNOSIS — E78.5 HYPERLIPIDEMIA, UNSPECIFIED HYPERLIPIDEMIA TYPE: ICD-10-CM

## 2025-02-28 DIAGNOSIS — Z79.899 LONG TERM USE OF DRUG: ICD-10-CM

## 2025-02-28 DIAGNOSIS — E88.810 METABOLIC SYNDROME: ICD-10-CM

## 2025-02-28 DIAGNOSIS — R53.83 FATIGUE, UNSPECIFIED TYPE: ICD-10-CM

## 2025-02-28 PROCEDURE — 84410 TESTOSTERONE BIOAVAILABLE: CPT

## 2025-02-28 PROCEDURE — 84402 ASSAY OF FREE TESTOSTERONE: CPT

## 2025-02-28 PROCEDURE — 84270 ASSAY OF SEX HORMONE GLOBUL: CPT

## 2025-02-28 PROCEDURE — 36415 COLL VENOUS BLD VENIPUNCTURE: CPT

## 2025-02-28 PROCEDURE — 84403 ASSAY OF TOTAL TESTOSTERONE: CPT

## 2025-03-01 LAB
TESTOST FREE SERPL-MCNC: 0.9 PG/ML (ref 0–4.2)
TESTOST SERPL-MCNC: 20 NG/DL (ref 4–50)

## 2025-03-17 LAB
SHBG SERPL-SCNC: 107 NMOL/L
TESTOST SERPL-MCNC: 29 NG/DL
TESTOSTERONE.FREE+WB MFR SERPL: 8.9 %
TESTOSTERONE.FREE+WB SERPL-MCNC: 2.6 NG/DL

## 2025-04-10 ENCOUNTER — HOSPITAL ENCOUNTER (OUTPATIENT)
Dept: RADIOLOGY | Facility: HOSPITAL | Age: 55
Discharge: HOME | End: 2025-04-10
Payer: COMMERCIAL

## 2025-04-10 VITALS — HEIGHT: 65 IN | BODY MASS INDEX: 34.82 KG/M2 | WEIGHT: 209 LBS

## 2025-04-10 DIAGNOSIS — R92.8 ABNORMAL MAMMOGRAM OF LEFT BREAST: ICD-10-CM

## 2025-04-10 DIAGNOSIS — Z12.31 BREAST CANCER SCREENING BY MAMMOGRAM: ICD-10-CM

## 2025-04-10 PROCEDURE — 76642 ULTRASOUND BREAST LIMITED: CPT

## 2025-04-10 PROCEDURE — G0279 TOMOSYNTHESIS, MAMMO: HCPCS

## 2025-04-10 PROCEDURE — 77066 DX MAMMO INCL CAD BI: CPT

## 2025-04-30 ENCOUNTER — OFFICE VISIT (OUTPATIENT)
Dept: URGENT CARE | Facility: CLINIC | Age: 55
End: 2025-04-30
Payer: COMMERCIAL

## 2025-04-30 VITALS
OXYGEN SATURATION: 99 % | BODY MASS INDEX: 40.27 KG/M2 | DIASTOLIC BLOOD PRESSURE: 88 MMHG | SYSTOLIC BLOOD PRESSURE: 138 MMHG | TEMPERATURE: 97.9 F | RESPIRATION RATE: 14 BRPM | HEART RATE: 73 BPM | WEIGHT: 242 LBS

## 2025-04-30 DIAGNOSIS — J01.90 ACUTE NON-RECURRENT SINUSITIS, UNSPECIFIED LOCATION: Primary | ICD-10-CM

## 2025-04-30 PROCEDURE — 99203 OFFICE O/P NEW LOW 30 MIN: CPT | Performed by: PHYSICIAN ASSISTANT

## 2025-04-30 NOTE — PROGRESS NOTES
[facility]        NAME: Jennyfer Salcedo is a 55 y.o. female  : 1970    MRN: [id number]  DATE: 2025  TIME: 7:30 PM    Assessment and Plan   Acute non-recurrent sinusitis, unspecified location [J01.90]  1. Acute non-recurrent sinusitis, unspecified location  amoxicillin-clavulanate (AUGMENTIN) 875-125 mg per tablet        Discussed importance of staying hydrated. Discussed supportive care and otc meds for symptomatic relief. May use tea with honey and a cool mist humidifier for symptoms. Encouraged salt water gargles if sore throat. Discussed strict return to care precautions as well as red flag symptoms which should prompt immediate ED referral. Pt verbalized understanding and is in agreement with plan.  Please follow up with your primary care provider within the next week. Please remember that your visit today was with an urgent care provider and should not replace follow up with your primary care provider for chronic medical issues or annual physicals.       Patient Instructions       Follow up with PCP in 3-5 days.  Proceed to  ER if symptoms worsen.    If tests are performed, our office will contact you with results only if changes need to made to the care plan discussed with you at the visit. [facility]    Chief Complaint     Chief Complaint   Patient presents with    Cold Like Symptoms     Pt presents with head/chest congestion, started two weeks ago         History of Present Illness       Jennyfer Salcedo is a(n) 55 y.o. female presenting with URI symptoms. Started 5 weeks ago and lasted for 2 weeks, then felt better for a week, then started again 2 weeks ago and has been persistent.   Past medical history: none pertinent  Congestion: yes  Sore throat: no  Cough: yes  Sputum production: yes, clear  Fever: no  Body aches: no  GI symptoms: no  Known sick contacts: no  OTC meds tried: advil, zyrtec-D without any relief          Review of  Systems   Review of Systems   Constitutional:  Positive for fatigue. Negative for chills, diaphoresis and fever.   HENT:  Positive for congestion. Negative for ear pain, postnasal drip, rhinorrhea, sinus pain, sneezing, sore throat and trouble swallowing.    Eyes:  Negative for pain and redness.   Respiratory:  Positive for cough. Negative for chest tightness, shortness of breath and wheezing.    Cardiovascular:  Negative for chest pain and leg swelling.   Gastrointestinal:  Negative for diarrhea, nausea and vomiting.   Musculoskeletal:  Negative for myalgias.   Neurological:  Negative for dizziness and weakness.         Current Medications       Current Outpatient Medications:     amoxicillin-clavulanate (AUGMENTIN) 875-125 mg per tablet, Take 1 tablet by mouth 2 (two) times a day with meals for 7 days, Disp: 14 tablet, Rfl: 0    multivitamin (THERAGRAN) TABS, Take 1 tablet by mouth daily, Disp: , Rfl:     Omega 3 1200 MG CAPS, Take by mouth, Disp: , Rfl:     testosterone (ANDROGEL) 25 MG/2.5GM (1%) GEL, Place 25 mg on the skin daily, Disp: , Rfl:     tirzepatide (Zepbound) 5 mg/0.5 mL auto-injector, Inject 5 mg under the skin once a week, Disp: , Rfl:     Current Allergies     Allergies as of 2025    (No Known Allergies)            The following portions of the patient's history were reviewed and updated as appropriate: allergies, current medications, past family history, past medical history, past social history, past surgical history and problem list.     Past Medical History:   Diagnosis Date    Abnormal mammogram of left breast     Anxiety     Dense breast     Menstrual irregularity     LIBORIO (obstructive sleep apnea)     Snoring     Vitamin D deficiency        Past Surgical History:   Procedure Laterality Date    BREAST BIOPSY Left     age 20s benign     SECTION      had 2 C sections       Family History   Problem Relation Age of Onset    Diabetes Father     Hypertension Father     Prostate cancer  Father     No Known Problems Mother     Skin cancer Sister     No Known Problems Daughter     Uterine cancer Maternal Grandmother     No Known Problems Paternal Grandmother     No Known Problems Sister     No Known Problems Sister     No Known Problems Sister          Medications have been verified.        Objective   /88   Pulse 73   Temp 97.9 °F (36.6 °C)   Resp 14   Wt 110 kg (242 lb)   LMP 03/15/2019   SpO2 99%   BMI 40.27 kg/m²        Physical Exam     Physical Exam  Vitals and nursing note reviewed.   Constitutional:       General: She is not in acute distress.     Appearance: Normal appearance. She is not toxic-appearing.   HENT:      Head: Normocephalic and atraumatic.      Right Ear: Tympanic membrane, ear canal and external ear normal.      Left Ear: Tympanic membrane, ear canal and external ear normal.      Nose: No congestion.      Mouth/Throat:      Mouth: Mucous membranes are moist.      Pharynx: Oropharynx is clear. No oropharyngeal exudate or posterior oropharyngeal erythema.   Eyes:      Conjunctiva/sclera: Conjunctivae normal.      Pupils: Pupils are equal, round, and reactive to light.   Cardiovascular:      Rate and Rhythm: Normal rate and regular rhythm.      Heart sounds: Normal heart sounds.   Pulmonary:      Effort: Pulmonary effort is normal. No respiratory distress.      Breath sounds: Normal breath sounds. No wheezing, rhonchi or rales.   Musculoskeletal:      Cervical back: Normal range of motion and neck supple.   Lymphadenopathy:      Cervical: No cervical adenopathy.   Skin:     General: Skin is warm and dry.      Capillary Refill: Capillary refill takes less than 2 seconds.   Neurological:      Mental Status: She is alert and oriented to person, place, and time.   Psychiatric:         Behavior: Behavior normal.

## 2025-08-11 ENCOUNTER — APPOINTMENT (OUTPATIENT)
Dept: LAB | Facility: CLINIC | Age: 55
End: 2025-08-11
Attending: STUDENT IN AN ORGANIZED HEALTH CARE EDUCATION/TRAINING PROGRAM
Payer: COMMERCIAL